# Patient Record
Sex: MALE | Race: WHITE | NOT HISPANIC OR LATINO | Employment: OTHER | URBAN - METROPOLITAN AREA
[De-identification: names, ages, dates, MRNs, and addresses within clinical notes are randomized per-mention and may not be internally consistent; named-entity substitution may affect disease eponyms.]

---

## 2023-02-17 PROBLEM — R60.0 BILATERAL LOWER EXTREMITY EDEMA: Status: ACTIVE | Noted: 2023-02-17

## 2023-02-17 PROBLEM — R33.9 URINARY RETENTION: Status: ACTIVE | Noted: 2023-02-17

## 2023-02-17 PROBLEM — N28.9 RENAL INSUFFICIENCY: Status: ACTIVE | Noted: 2023-02-17

## 2023-02-17 PROBLEM — E78.5 HYPERLIPIDEMIA: Status: ACTIVE | Noted: 2023-02-17

## 2023-02-17 PROBLEM — R31.0 GROSS HEMATURIA: Status: ACTIVE | Noted: 2023-02-17

## 2023-02-17 PROBLEM — R01.1 MURMUR, CARDIAC: Status: ACTIVE | Noted: 2023-02-17

## 2023-02-17 PROBLEM — R35.1 NOCTURIA: Status: ACTIVE | Noted: 2023-02-17

## 2023-02-17 PROBLEM — I10 HYPERTENSION: Status: ACTIVE | Noted: 2023-02-17

## 2023-02-17 PROBLEM — D64.9 ANEMIA: Status: ACTIVE | Noted: 2023-02-17

## 2023-02-17 PROBLEM — G47.30 SLEEP APNEA: Status: ACTIVE | Noted: 2023-02-17

## 2023-02-17 PROBLEM — E87.3 METABOLIC ALKALOSIS: Status: ACTIVE | Noted: 2023-02-17

## 2023-02-17 PROBLEM — E66.01 CLASS 2 SEVERE OBESITY DUE TO EXCESS CALORIES WITH SERIOUS COMORBIDITY AND BODY MASS INDEX (BMI) OF 36.0 TO 36.9 IN ADULT (MULTI): Status: ACTIVE | Noted: 2023-02-17

## 2023-02-17 PROBLEM — E66.812 CLASS 2 SEVERE OBESITY DUE TO EXCESS CALORIES WITH SERIOUS COMORBIDITY AND BODY MASS INDEX (BMI) OF 36.0 TO 36.9 IN ADULT: Status: ACTIVE | Noted: 2023-02-17

## 2023-02-17 PROBLEM — N18.31 CHRONIC KIDNEY DISEASE, STAGE 3A (MULTI): Status: ACTIVE | Noted: 2023-02-17

## 2023-02-17 RX ORDER — PRAVASTATIN SODIUM 40 MG/1
40 TABLET ORAL DAILY
COMMUNITY
Start: 2022-11-15 | End: 2023-04-10 | Stop reason: SDUPTHER

## 2023-02-17 RX ORDER — METOPROLOL SUCCINATE 50 MG/1
50 TABLET, EXTENDED RELEASE ORAL DAILY
COMMUNITY
End: 2023-04-10 | Stop reason: SDUPTHER

## 2023-02-17 RX ORDER — ASPIRIN 81 MG/1
81 TABLET ORAL DAILY
COMMUNITY

## 2023-02-17 RX ORDER — CHOLECALCIFEROL (VITAMIN D3) 25 MCG
25 TABLET ORAL DAILY
COMMUNITY
End: 2023-10-09 | Stop reason: SDUPTHER

## 2023-02-17 RX ORDER — FUROSEMIDE 40 MG/1
40 TABLET ORAL DAILY
COMMUNITY
Start: 2021-07-09 | End: 2023-04-10 | Stop reason: SDUPTHER

## 2023-02-17 RX ORDER — TAMSULOSIN HYDROCHLORIDE 0.4 MG/1
0.4 CAPSULE ORAL NIGHTLY
COMMUNITY
Start: 2021-10-14 | End: 2023-10-21 | Stop reason: SDUPTHER

## 2023-02-17 RX ORDER — BENAZEPRIL HYDROCHLORIDE 20 MG/1
20 TABLET ORAL DAILY
COMMUNITY
End: 2023-04-10 | Stop reason: SDUPTHER

## 2023-04-10 ENCOUNTER — OFFICE VISIT (OUTPATIENT)
Dept: PRIMARY CARE | Facility: CLINIC | Age: 68
End: 2023-04-10
Payer: MEDICARE

## 2023-04-10 VITALS
HEART RATE: 75 BPM | OXYGEN SATURATION: 96 % | BODY MASS INDEX: 36.02 KG/M2 | SYSTOLIC BLOOD PRESSURE: 112 MMHG | WEIGHT: 211 LBS | DIASTOLIC BLOOD PRESSURE: 70 MMHG | HEIGHT: 64 IN

## 2023-04-10 DIAGNOSIS — R60.0 BILATERAL LOWER EXTREMITY EDEMA: ICD-10-CM

## 2023-04-10 DIAGNOSIS — N18.31 CHRONIC KIDNEY DISEASE, STAGE 3A (MULTI): ICD-10-CM

## 2023-04-10 DIAGNOSIS — Z00.00 MEDICARE ANNUAL WELLNESS VISIT, SUBSEQUENT: ICD-10-CM

## 2023-04-10 DIAGNOSIS — E78.2 MIXED HYPERLIPIDEMIA: ICD-10-CM

## 2023-04-10 DIAGNOSIS — E66.01 CLASS 2 SEVERE OBESITY DUE TO EXCESS CALORIES WITH SERIOUS COMORBIDITY AND BODY MASS INDEX (BMI) OF 36.0 TO 36.9 IN ADULT (MULTI): Primary | ICD-10-CM

## 2023-04-10 DIAGNOSIS — I10 PRIMARY HYPERTENSION: ICD-10-CM

## 2023-04-10 PROBLEM — N28.9 RENAL INSUFFICIENCY: Status: RESOLVED | Noted: 2023-02-17 | Resolved: 2023-04-10

## 2023-04-10 PROCEDURE — 3074F SYST BP LT 130 MM HG: CPT | Performed by: NURSE PRACTITIONER

## 2023-04-10 PROCEDURE — 1159F MED LIST DOCD IN RCRD: CPT | Performed by: NURSE PRACTITIONER

## 2023-04-10 PROCEDURE — 3078F DIAST BP <80 MM HG: CPT | Performed by: NURSE PRACTITIONER

## 2023-04-10 PROCEDURE — 3008F BODY MASS INDEX DOCD: CPT | Performed by: NURSE PRACTITIONER

## 2023-04-10 PROCEDURE — 1123F ACP DISCUSS/DSCN MKR DOCD: CPT | Performed by: NURSE PRACTITIONER

## 2023-04-10 PROCEDURE — 1036F TOBACCO NON-USER: CPT | Performed by: NURSE PRACTITIONER

## 2023-04-10 PROCEDURE — 1160F RVW MEDS BY RX/DR IN RCRD: CPT | Performed by: NURSE PRACTITIONER

## 2023-04-10 PROCEDURE — 1158F ADVNC CARE PLAN TLK DOCD: CPT | Performed by: NURSE PRACTITIONER

## 2023-04-10 PROCEDURE — 99214 OFFICE O/P EST MOD 30 MIN: CPT | Performed by: NURSE PRACTITIONER

## 2023-04-10 PROCEDURE — 1170F FXNL STATUS ASSESSED: CPT | Performed by: NURSE PRACTITIONER

## 2023-04-10 PROCEDURE — G0439 PPPS, SUBSEQ VISIT: HCPCS | Performed by: NURSE PRACTITIONER

## 2023-04-10 RX ORDER — FUROSEMIDE 40 MG/1
40 TABLET ORAL DAILY
Qty: 90 TABLET | Refills: 1 | Status: SHIPPED | OUTPATIENT
Start: 2023-04-10 | End: 2023-10-09 | Stop reason: SDUPTHER

## 2023-04-10 RX ORDER — METOPROLOL SUCCINATE 50 MG/1
50 TABLET, EXTENDED RELEASE ORAL DAILY
Qty: 90 TABLET | Refills: 1 | Status: SHIPPED | OUTPATIENT
Start: 2023-04-10 | End: 2023-10-09 | Stop reason: SDUPTHER

## 2023-04-10 RX ORDER — PRAVASTATIN SODIUM 40 MG/1
40 TABLET ORAL DAILY
Qty: 90 TABLET | Refills: 1 | Status: SHIPPED | OUTPATIENT
Start: 2023-04-10 | End: 2023-10-09 | Stop reason: SDUPTHER

## 2023-04-10 RX ORDER — BENAZEPRIL HYDROCHLORIDE 20 MG/1
20 TABLET ORAL DAILY
Qty: 90 TABLET | Refills: 1 | Status: SHIPPED | OUTPATIENT
Start: 2023-04-10 | End: 2023-10-09 | Stop reason: SDUPTHER

## 2023-04-10 ASSESSMENT — PATIENT HEALTH QUESTIONNAIRE - PHQ9
2. FEELING DOWN, DEPRESSED OR HOPELESS: NOT AT ALL
1. LITTLE INTEREST OR PLEASURE IN DOING THINGS: NOT AT ALL
SUM OF ALL RESPONSES TO PHQ9 QUESTIONS 1 AND 2: 0

## 2023-04-10 ASSESSMENT — ENCOUNTER SYMPTOMS
PALPITATIONS: 0
DIARRHEA: 0
DYSURIA: 0
BLOOD IN STOOL: 0
SHORTNESS OF BREATH: 0
VOMITING: 0
DIZZINESS: 0
LIGHT-HEADEDNESS: 0
ABDOMINAL PAIN: 0
HEADACHES: 0
ARTHRALGIAS: 0
NAUSEA: 0
CONSTIPATION: 0
MYALGIAS: 0
COLOR CHANGE: 0
CHEST TIGHTNESS: 0
FATIGUE: 0

## 2023-04-10 ASSESSMENT — ACTIVITIES OF DAILY LIVING (ADL)
MANAGING_FINANCES: NEEDS ASSISTANCE
DOING_HOUSEWORK: INDEPENDENT
DRESSING: INDEPENDENT
GROCERY_SHOPPING: NEEDS ASSISTANCE
BATHING: INDEPENDENT
TAKING_MEDICATION: INDEPENDENT

## 2023-04-10 NOTE — PROGRESS NOTES
"Subjective   Patient ID: Silvio Hassan is a 67 y.o. male who presents for [facility] Exam; routine check up; lab and med review.      HPI  Silvio here with his brother.       Review of Systems    Objective   Vitals:    04/10/23 1429   BP: 112/70   BP Location: Left arm   Pulse: 75   SpO2: 96%   Weight: 95.7 kg (211 lb)   Height: 1.626 m (5' 4\")      Physical Exam    Assessment/Plan   There are no diagnoses linked to this encounter.      "

## 2023-04-10 NOTE — ASSESSMENT & PLAN NOTE
Continue Lasix 40 mg daily.  Bilateral legs with non-pitting edema noted. Reduces with elevation.

## 2023-04-10 NOTE — ACP (ADVANCE CARE PLANNING)
Advance Care Planning Note     Discussion Date: 04/10/23   Discussion Participants: patient and brother    The patient wishes to discuss Advance Care Planning today and the following is a brief summary of our discussion.     Patient has capacity to make their own medical decisions:  has mental handicap  Health Care Agent/Surrogate Decision Maker documented in chart: Yes Victoriano Hassan    Documents on file and valid:  Advance Directive/Living Will:  has one, but not on file    Health Care Power of :  has one, but not on file  Other:       Communication of Medical Status/Prognosis:     Communication of Treatment Goals/Options:     Treatment Decisions    Time Statement: Total face to face time spent on advance care planning was 5 minutes with 5 minutes spent in counseling, including the explanation.    SCOTT Basilio-CNP  4/10/2023 3:01 PM

## 2023-04-10 NOTE — PROGRESS NOTES
"Subjective   Reason for Visit: Silvio Hassan is an 67 y.o. male here for a Medicare Wellness visit.     Past Medical, Surgical, and Family History reviewed and updated in chart.         LOAN Anguiano returns with his brother, Victoriano for Medicare wellness visit.     Patient Care Team:  SCOTT Basilio-CNP as PCP - General (Family Medicine)     Review of Systems   Constitutional:  Negative for fatigue.   Respiratory:  Negative for chest tightness and shortness of breath.    Cardiovascular:  Positive for leg swelling. Negative for chest pain and palpitations.   Gastrointestinal:  Negative for abdominal pain, blood in stool, constipation, diarrhea, nausea and vomiting.   Genitourinary:  Negative for dysuria.   Musculoskeletal:  Negative for arthralgias and myalgias.   Skin:  Negative for color change.   Neurological:  Negative for dizziness, light-headedness and headaches.       Objective   Vitals:  /70 (BP Location: Left arm)   Pulse 75   Ht 1.626 m (5' 4\")   Wt 95.7 kg (211 lb)   SpO2 96%   BMI 36.22 kg/m²       Physical Exam  Vitals and nursing note reviewed.   Constitutional:       Appearance: Normal appearance.   HENT:      Head: Normocephalic and atraumatic.   Cardiovascular:      Rate and Rhythm: Normal rate and regular rhythm.      Heart sounds: Murmur heard.   Pulmonary:      Effort: Pulmonary effort is normal.      Breath sounds: Normal breath sounds.   Abdominal:      General: Bowel sounds are normal.      Palpations: Abdomen is soft.   Musculoskeletal:      Right lower leg: Edema present.      Left lower leg: Edema present.   Skin:     General: Skin is warm and dry.   Neurological:      Mental Status: He is alert and oriented to person, place, and time.   Psychiatric:         Mood and Affect: Mood normal.         Behavior: Behavior normal.         Thought Content: Thought content normal.         Judgment: Judgment normal.         Assessment/Plan   Problem List Items Addressed This Visit       " Bilateral lower extremity edema    Current Assessment & Plan     Continue Lasix 40 mg daily.  Bilateral legs with non-pitting edema noted. Reduces with elevation.          Relevant Medications    furosemide (Lasix) 40 mg tablet    Chronic kidney disease, stage 3a    Current Assessment & Plan     Improved  4/7/23: BUN CR 20/0.98, eGFR 85         Hypertension    Current Assessment & Plan     Controlled today.   Continue:   Metoprolol 50 mg daily  Benazepril 20 mg daily  Furosemide 40 mg daily         Relevant Medications    metoprolol succinate XL (Toprol-XL) 50 mg 24 hr tablet    benazepril (Lotensin) 20 mg tablet    Class 2 severe obesity due to excess calories with serious comorbidity and body mass index (BMI) of 36.0 to 36.9 in adult (CMS/[facility]) - Primary    Hyperlipidemia    Overview     4/7/23: Chol 131, HDL 48, LDL 65, Triglycerides 97         Current Assessment & Plan     Continue Pravastatin 40 mg daily  Repeat lipid panel in 1 year.          Relevant Medications    pravastatin (Pravachol) 40 mg tablet     Other Visit Diagnoses       Medicare annual wellness visit, subsequent                  Follow up in 6 months for chronic care.

## 2023-04-10 NOTE — ASSESSMENT & PLAN NOTE
Controlled today.   Continue:   Metoprolol 50 mg daily  Benazepril 20 mg daily  Furosemide 40 mg daily

## 2023-04-16 PROBLEM — R31.0 GROSS HEMATURIA: Status: RESOLVED | Noted: 2023-02-17 | Resolved: 2023-04-16

## 2023-04-16 PROBLEM — E87.3 METABOLIC ALKALOSIS: Status: RESOLVED | Noted: 2023-02-17 | Resolved: 2023-04-16

## 2023-04-16 PROBLEM — R33.9 URINARY RETENTION: Status: RESOLVED | Noted: 2023-02-17 | Resolved: 2023-04-16

## 2023-04-16 NOTE — PATIENT INSTRUCTIONS
BMI was above normal measurement. Current weight:    Weight change since last visit (-) denotes wt loss     Weight loss needed to achieve BMI 25:   Lbs  Weight loss needed to achieve BMI 30:   Lbs  Instructed on a healthy diet and exercise.

## 2023-10-09 ENCOUNTER — OFFICE VISIT (OUTPATIENT)
Dept: PRIMARY CARE | Facility: CLINIC | Age: 68
End: 2023-10-09
Payer: MEDICARE

## 2023-10-09 VITALS
HEIGHT: 64 IN | WEIGHT: 226.25 LBS | HEART RATE: 86 BPM | SYSTOLIC BLOOD PRESSURE: 118 MMHG | BODY MASS INDEX: 38.63 KG/M2 | DIASTOLIC BLOOD PRESSURE: 60 MMHG | OXYGEN SATURATION: 95 %

## 2023-10-09 DIAGNOSIS — Z23 IMMUNIZATION DUE: ICD-10-CM

## 2023-10-09 DIAGNOSIS — R21 RASH: ICD-10-CM

## 2023-10-09 DIAGNOSIS — E78.2 MIXED HYPERLIPIDEMIA: ICD-10-CM

## 2023-10-09 DIAGNOSIS — E66.01 CLASS 2 SEVERE OBESITY DUE TO EXCESS CALORIES WITH SERIOUS COMORBIDITY AND BODY MASS INDEX (BMI) OF 38.0 TO 38.9 IN ADULT (MULTI): ICD-10-CM

## 2023-10-09 DIAGNOSIS — E55.9 VITAMIN D DEFICIENCY: ICD-10-CM

## 2023-10-09 DIAGNOSIS — I10 PRIMARY HYPERTENSION: Primary | ICD-10-CM

## 2023-10-09 DIAGNOSIS — R60.0 BILATERAL LOWER EXTREMITY EDEMA: ICD-10-CM

## 2023-10-09 PROCEDURE — 1036F TOBACCO NON-USER: CPT | Performed by: NURSE PRACTITIONER

## 2023-10-09 PROCEDURE — 99214 OFFICE O/P EST MOD 30 MIN: CPT | Performed by: NURSE PRACTITIONER

## 2023-10-09 PROCEDURE — 3078F DIAST BP <80 MM HG: CPT | Performed by: NURSE PRACTITIONER

## 2023-10-09 PROCEDURE — 1160F RVW MEDS BY RX/DR IN RCRD: CPT | Performed by: NURSE PRACTITIONER

## 2023-10-09 PROCEDURE — 90662 IIV NO PRSV INCREASED AG IM: CPT | Performed by: NURSE PRACTITIONER

## 2023-10-09 PROCEDURE — 1158F ADVNC CARE PLAN TLK DOCD: CPT | Performed by: NURSE PRACTITIONER

## 2023-10-09 PROCEDURE — 3008F BODY MASS INDEX DOCD: CPT | Performed by: NURSE PRACTITIONER

## 2023-10-09 PROCEDURE — G0008 ADMIN INFLUENZA VIRUS VAC: HCPCS | Performed by: NURSE PRACTITIONER

## 2023-10-09 PROCEDURE — 3074F SYST BP LT 130 MM HG: CPT | Performed by: NURSE PRACTITIONER

## 2023-10-09 PROCEDURE — 1159F MED LIST DOCD IN RCRD: CPT | Performed by: NURSE PRACTITIONER

## 2023-10-09 RX ORDER — FUROSEMIDE 40 MG/1
40 TABLET ORAL DAILY
Qty: 90 TABLET | Refills: 1 | Status: SHIPPED | OUTPATIENT
Start: 2023-10-09 | End: 2024-03-13 | Stop reason: SDUPTHER

## 2023-10-09 RX ORDER — BENAZEPRIL HYDROCHLORIDE 20 MG/1
20 TABLET ORAL DAILY
Qty: 90 TABLET | Refills: 1 | Status: SHIPPED | OUTPATIENT
Start: 2023-10-09

## 2023-10-09 RX ORDER — NYSTATIN 100000 U/G
CREAM TOPICAL 2 TIMES DAILY
Qty: 30 G | Refills: 0 | Status: SHIPPED | OUTPATIENT
Start: 2023-10-09

## 2023-10-09 RX ORDER — METOPROLOL SUCCINATE 50 MG/1
50 TABLET, EXTENDED RELEASE ORAL DAILY
Qty: 90 TABLET | Refills: 1 | Status: SHIPPED | OUTPATIENT
Start: 2023-10-09

## 2023-10-09 RX ORDER — CHOLECALCIFEROL (VITAMIN D3) 25 MCG
25 TABLET ORAL DAILY
Qty: 90 TABLET | Refills: 1 | Status: SHIPPED | OUTPATIENT
Start: 2023-10-09

## 2023-10-09 RX ORDER — PRAVASTATIN SODIUM 40 MG/1
40 TABLET ORAL DAILY
Qty: 90 TABLET | Refills: 1 | Status: SHIPPED | OUTPATIENT
Start: 2023-10-09 | End: 2024-05-20 | Stop reason: SDUPTHER

## 2023-10-09 ASSESSMENT — ENCOUNTER SYMPTOMS
HEADACHES: 0
PALPITATIONS: 0
VOMITING: 0
DIARRHEA: 0
CHEST TIGHTNESS: 0
FATIGUE: 0
NAUSEA: 0
BLOOD IN STOOL: 0
CONSTIPATION: 0
SHORTNESS OF BREATH: 0
DYSURIA: 0
MYALGIAS: 0
ABDOMINAL PAIN: 0
LIGHT-HEADEDNESS: 0
ARTHRALGIAS: 0
COLOR CHANGE: 0
DIZZINESS: 0

## 2023-10-09 NOTE — PROGRESS NOTES
"Subjective   Patient ID: Silvio Hassan is a 67 y.o. male who presents for Follow-up.    HPI   Silvio returns with his brother arash, for follow up.    No specific concerns. He did develop a small rash under his left axilla believed to be from his deodorant. They are going to change brands to see if it will clear up.       Rash of left axilla: Started a couple weeks ago, believed to be from deodorant, they are going to try different brands and then will try cream if symptoms persist.     Review of Systems   Constitutional:  Negative for fatigue.   Respiratory:  Negative for chest tightness and shortness of breath.    Cardiovascular:  Negative for chest pain, palpitations and leg swelling.   Gastrointestinal:  Negative for abdominal pain, blood in stool, constipation, diarrhea, nausea and vomiting.   Genitourinary:  Negative for dysuria.   Musculoskeletal:  Negative for arthralgias and myalgias.   Skin:  Negative for color change.   Neurological:  Negative for dizziness, light-headedness and headaches.       Objective   /60   Pulse 86   Ht 1.626 m (5' 4\")   Wt 103 kg (226 lb 4 oz)   SpO2 95%   BMI 38.84 kg/m²     Physical Exam  Vitals and nursing note reviewed.   Constitutional:       Appearance: Normal appearance.   Cardiovascular:      Rate and Rhythm: Normal rate and regular rhythm.      Heart sounds: Murmur heard.   Pulmonary:      Effort: Pulmonary effort is normal.      Breath sounds: Normal breath sounds.   Skin:            Comments: Left axilla redness raised rash.    Neurological:      Mental Status: He is alert and oriented to person, place, and time.   Psychiatric:         Mood and Affect: Mood normal.         Behavior: Behavior normal.         Thought Content: Thought content normal.         Judgment: Judgment normal.         Assessment/Plan   Problem List Items Addressed This Visit             ICD-10-CM    Bilateral lower extremity edema R60.0     Lasix 40 mg daily - refilled         Relevant " Medications    furosemide (Lasix) 40 mg tablet    Hypertension - Primary I10     Controlled  Metoprolol 50 mg daily- refilled  Benazepril 20 mg daily- refilled           Relevant Medications    benazepril (Lotensin) 20 mg tablet    metoprolol succinate XL (Toprol-XL) 50 mg 24 hr tablet    Other Relevant Orders    CBC and Auto Differential    Class 2 severe obesity due to excess calories with serious comorbidity and body mass index (BMI) of 38.0 to 38.9 in adult (CMS/[facility]) E66.01, Z68.38     Pt advised to institute a healthy, well-balanced meal with portion control and to exercise daily for at least 30-60 minutes.         Hyperlipidemia E78.5     Pravastatin 40 mg daily- refilled         Relevant Medications    pravastatin (Pravachol) 40 mg tablet    Other Relevant Orders    Comprehensive Metabolic Panel    Lipid Panel    Vitamin D deficiency E55.9    Relevant Medications    cholecalciferol (Vitamin D-3) 25 MCG (1000 UT) tablet     Other Visit Diagnoses         Codes    Rash     R21    Relevant Medications    nystatin (Mycostatin) cream    Immunization due     Z23    Relevant Orders    Flu vaccine, quadrivalent, high-dose, preservative free, age 65y+ (FLUZONE) (Completed)               Follow up in 6 months for [facility] visit. Return precautions discussed. They will get lab work prior to appointment.     Flu vaccine today.

## 2023-10-21 DIAGNOSIS — N40.0 BENIGN PROSTATIC HYPERPLASIA, UNSPECIFIED WHETHER LOWER URINARY TRACT SYMPTOMS PRESENT: Primary | ICD-10-CM

## 2023-10-21 RX ORDER — TAMSULOSIN HYDROCHLORIDE 0.4 MG/1
0.4 CAPSULE ORAL NIGHTLY
Qty: 90 CAPSULE | Refills: 1 | Status: SHIPPED | OUTPATIENT
Start: 2023-10-21 | End: 2024-03-13 | Stop reason: SDUPTHER

## 2023-10-21 NOTE — PROGRESS NOTES
Phone call this am stating that patient's meds were sent in at his last appointment but the pharmacy did not have the Rx for tamsulosin - Rx was sent in today.

## 2023-11-28 ENCOUNTER — TELEPHONE (OUTPATIENT)
Dept: PRIMARY CARE | Facility: CLINIC | Age: 68
End: 2023-11-28

## 2023-11-28 DIAGNOSIS — R21 RASH: Primary | ICD-10-CM

## 2023-11-28 RX ORDER — TRIAMCINOLONE ACETONIDE 1 MG/G
CREAM TOPICAL 2 TIMES DAILY
Qty: 15 G | Refills: 0 | Status: SHIPPED | OUTPATIENT
Start: 2023-11-28 | End: 2023-12-28 | Stop reason: SDUPTHER

## 2023-11-28 NOTE — TELEPHONE ENCOUNTER
Mom called in the rash under Harry's arm is not getting any better, may be getting worse.   She states she is doing everything they were told to do.

## 2023-12-28 ENCOUNTER — TELEPHONE (OUTPATIENT)
Dept: PRIMARY CARE | Facility: CLINIC | Age: 68
End: 2023-12-28

## 2023-12-28 DIAGNOSIS — R21 RASH: ICD-10-CM

## 2023-12-28 RX ORDER — TRIAMCINOLONE ACETONIDE 1 MG/G
CREAM TOPICAL 2 TIMES DAILY
Qty: 80 G | Refills: 1 | Status: SHIPPED | OUTPATIENT
Start: 2023-12-28

## 2023-12-28 NOTE — TELEPHONE ENCOUNTER
Received a call from Family   they used all the 2nd cream that was called in.   This cream did help a little   The rash improved.   However did not go away.   He is now out of the cream and the rash is back.   They have an appointment with you in 2 weeks.  They are asking for the same cream or something different until they can get in to see you.

## 2024-03-12 ENCOUNTER — TELEPHONE (OUTPATIENT)
Dept: PRIMARY CARE | Facility: CLINIC | Age: 69
End: 2024-03-12
Payer: MEDICARE

## 2024-03-12 NOTE — TELEPHONE ENCOUNTER
Victoriano (Brother) stopped by requesting medication refills on tamsulosin 0.4mg and furosemide 40mg for Silvio. Pharmacy is [address]

## 2024-03-13 DIAGNOSIS — R60.0 BILATERAL LOWER EXTREMITY EDEMA: ICD-10-CM

## 2024-03-13 DIAGNOSIS — N40.0 BENIGN PROSTATIC HYPERPLASIA, UNSPECIFIED WHETHER LOWER URINARY TRACT SYMPTOMS PRESENT: ICD-10-CM

## 2024-03-13 RX ORDER — TAMSULOSIN HYDROCHLORIDE 0.4 MG/1
0.4 CAPSULE ORAL NIGHTLY
Qty: 90 CAPSULE | Refills: 1 | Status: SHIPPED | OUTPATIENT
Start: 2024-03-13

## 2024-03-13 RX ORDER — FUROSEMIDE 40 MG/1
40 TABLET ORAL DAILY
Qty: 90 TABLET | Refills: 1 | Status: SHIPPED | OUTPATIENT
Start: 2024-03-13

## 2024-05-20 ENCOUNTER — OFFICE VISIT (OUTPATIENT)
Dept: PRIMARY CARE | Facility: CLINIC | Age: 69
End: 2024-05-20
Payer: MEDICARE

## 2024-05-20 VITALS
BODY MASS INDEX: 41 KG/M2 | DIASTOLIC BLOOD PRESSURE: 80 MMHG | WEIGHT: 240.13 LBS | HEIGHT: 64 IN | HEART RATE: 73 BPM | SYSTOLIC BLOOD PRESSURE: 116 MMHG

## 2024-05-20 DIAGNOSIS — I50.9 CHRONIC HEART FAILURE, UNSPECIFIED HEART FAILURE TYPE (MULTI): ICD-10-CM

## 2024-05-20 DIAGNOSIS — E66.01 CLASS 3 SEVERE OBESITY DUE TO EXCESS CALORIES WITH SERIOUS COMORBIDITY AND BODY MASS INDEX (BMI) OF 40.0 TO 44.9 IN ADULT (MULTI): ICD-10-CM

## 2024-05-20 DIAGNOSIS — D69.6 THROMBOCYTOPENIA (CMS-HCC): ICD-10-CM

## 2024-05-20 DIAGNOSIS — E78.2 MIXED HYPERLIPIDEMIA: ICD-10-CM

## 2024-05-20 DIAGNOSIS — N18.31 CHRONIC KIDNEY DISEASE, STAGE 3A (MULTI): ICD-10-CM

## 2024-05-20 DIAGNOSIS — Z00.00 MEDICARE ANNUAL WELLNESS VISIT, SUBSEQUENT: Primary | ICD-10-CM

## 2024-05-20 DIAGNOSIS — Z12.5 SCREENING FOR PROSTATE CANCER: ICD-10-CM

## 2024-05-20 PROCEDURE — 1160F RVW MEDS BY RX/DR IN RCRD: CPT | Performed by: NURSE PRACTITIONER

## 2024-05-20 PROCEDURE — 3008F BODY MASS INDEX DOCD: CPT | Performed by: NURSE PRACTITIONER

## 2024-05-20 PROCEDURE — 3079F DIAST BP 80-89 MM HG: CPT | Performed by: NURSE PRACTITIONER

## 2024-05-20 PROCEDURE — 99213 OFFICE O/P EST LOW 20 MIN: CPT | Performed by: NURSE PRACTITIONER

## 2024-05-20 PROCEDURE — 1123F ACP DISCUSS/DSCN MKR DOCD: CPT | Performed by: NURSE PRACTITIONER

## 2024-05-20 PROCEDURE — 1159F MED LIST DOCD IN RCRD: CPT | Performed by: NURSE PRACTITIONER

## 2024-05-20 PROCEDURE — 3074F SYST BP LT 130 MM HG: CPT | Performed by: NURSE PRACTITIONER

## 2024-05-20 PROCEDURE — 1170F FXNL STATUS ASSESSED: CPT | Performed by: NURSE PRACTITIONER

## 2024-05-20 PROCEDURE — G0439 PPPS, SUBSEQ VISIT: HCPCS | Performed by: NURSE PRACTITIONER

## 2024-05-20 RX ORDER — PRAVASTATIN SODIUM 40 MG/1
40 TABLET ORAL DAILY
Qty: 90 TABLET | Refills: 1 | Status: SHIPPED | OUTPATIENT
Start: 2024-05-20

## 2024-05-20 ASSESSMENT — ACTIVITIES OF DAILY LIVING (ADL)
DRESSING: INDEPENDENT
BATHING: INDEPENDENT
GROCERY_SHOPPING: NEEDS ASSISTANCE
MANAGING_FINANCES: NEEDS ASSISTANCE
TAKING_MEDICATION: INDEPENDENT
DOING_HOUSEWORK: INDEPENDENT

## 2024-05-20 ASSESSMENT — ENCOUNTER SYMPTOMS
DEPRESSION: 0
LOSS OF SENSATION IN FEET: 0
OCCASIONAL FEELINGS OF UNSTEADINESS: 0

## 2024-05-20 NOTE — PROGRESS NOTES
"Subjective   Reason for Visit: Silvio Hassan is an 68 y.o. male here for a Medicare Wellness visit.     Past Medical, Surgical, and Family History reviewed and updated in chart.         HPI  Silvio returns with his brother, Victoriano for [facility] exam. No concerns today.     His weight has increased some from last visit. He has slight edema noted to bilateral legs. Right > left. Also noted some redness to bilateral legs again R> left.     PSA: ordered  Colon Cancer screen:       Patient Care Team:  SCOTT Basilio-CNP as PCP - General (Family Medicine)     Review of Systems   Constitutional:  Negative for fatigue.   Respiratory:  Negative for chest tightness and shortness of breath.    Cardiovascular:  Positive for leg swelling (bilateral legs right > left). Negative for chest pain and palpitations.   Gastrointestinal:  Negative for abdominal pain, blood in stool, constipation, diarrhea, nausea and vomiting.   Genitourinary:  Negative for dysuria.   Musculoskeletal:  Negative for arthralgias and myalgias.   Skin:  Negative for color change.   Neurological:  Negative for dizziness, light-headedness and headaches.   Hematological: Negative.    Psychiatric/Behavioral: Negative.         Objective   Vitals:  /80   Pulse 73   Ht 1.626 m (5' 4\")   Wt 109 kg (240 lb 2 oz)   BMI 41.22 kg/m²       Physical Exam  Vitals and nursing note reviewed.   Constitutional:       Appearance: Normal appearance.   HENT:      Head: Normocephalic and atraumatic.   Cardiovascular:      Rate and Rhythm: Normal rate and regular rhythm.      Pulses: Normal pulses.      Heart sounds: Normal heart sounds.   Pulmonary:      Effort: Pulmonary effort is normal.      Breath sounds: Normal breath sounds.   Abdominal:      General: Bowel sounds are normal.      Palpations: Abdomen is soft.   Musculoskeletal:         General: Normal range of motion.      Cervical back: Normal range of motion.   Skin:     General: Skin is warm and dry.      " Findings: Erythema present.      Comments: Bilateral lower legs Right> left    Neurological:      General: No focal deficit present.      Mental Status: He is alert and oriented to person, place, and time. Mental status is at baseline.   Psychiatric:         Mood and Affect: Mood normal.         Behavior: Behavior normal.         Thought Content: Thought content normal.         Judgment: Judgment normal.         Assessment/Plan   Problem List Items Addressed This Visit       Chronic kidney disease, stage 3a (Multi)    Current Assessment & Plan     Did not get lab work before appt, is going to do this  Will follow up         Class 3 severe obesity due to excess calories with serious comorbidity and body mass index (BMI) of 40.0 to 44.9 in adult (Multi)    Hyperlipidemia    Overview     4/7/23: Chol 131, HDL 48, LDL 65, Triglycerides 97         Current Assessment & Plan     Pravastatin 40 mg daily- refilled         Relevant Medications    pravastatin (Pravachol) 40 mg tablet    Chronic heart failure, unspecified heart failure type (Multi)    Current Assessment & Plan     Stable  No shortness of breath  Weight is increased, slight lower extremity edema.          Thrombocytopenia (CMS-HCC)    Current Assessment & Plan     Waiting on lab work-did not get before his appt today.           Other Visit Diagnoses       Medicare annual wellness visit, subsequent    -  Primary    Screening for prostate cancer        Relevant Orders    Prostate Spec.Ag,Screen              Follow up in 6 months. We will contact them with results of the labs.

## 2024-05-24 PROBLEM — I50.9 CHRONIC HEART FAILURE, UNSPECIFIED HEART FAILURE TYPE (MULTI): Status: ACTIVE | Noted: 2024-05-24

## 2024-05-24 PROBLEM — D69.6 THROMBOCYTOPENIA (CMS-HCC): Status: ACTIVE | Noted: 2024-05-24

## 2024-05-24 PROBLEM — E66.813 CLASS 3 SEVERE OBESITY DUE TO EXCESS CALORIES WITH SERIOUS COMORBIDITY AND BODY MASS INDEX (BMI) OF 40.0 TO 44.9 IN ADULT: Status: ACTIVE | Noted: 2023-02-17

## 2024-05-24 ASSESSMENT — ENCOUNTER SYMPTOMS
BLOOD IN STOOL: 0
CONSTIPATION: 0
CHEST TIGHTNESS: 0
HEMATOLOGIC/LYMPHATIC NEGATIVE: 1
LIGHT-HEADEDNESS: 0
DIARRHEA: 0
DYSURIA: 0
ARTHRALGIAS: 0
MYALGIAS: 0
DIZZINESS: 0
ABDOMINAL PAIN: 0
COLOR CHANGE: 0
FATIGUE: 0
PALPITATIONS: 0
VOMITING: 0
NAUSEA: 0
PSYCHIATRIC NEGATIVE: 1
HEADACHES: 0
SHORTNESS OF BREATH: 0

## 2024-07-01 DIAGNOSIS — E78.2 MIXED HYPERLIPIDEMIA: ICD-10-CM

## 2024-07-01 DIAGNOSIS — I10 PRIMARY HYPERTENSION: ICD-10-CM

## 2024-07-01 DIAGNOSIS — R60.0 BILATERAL LOWER EXTREMITY EDEMA: ICD-10-CM

## 2024-07-01 DIAGNOSIS — N40.0 BENIGN PROSTATIC HYPERPLASIA, UNSPECIFIED WHETHER LOWER URINARY TRACT SYMPTOMS PRESENT: ICD-10-CM

## 2024-07-08 RX ORDER — FUROSEMIDE 40 MG/1
40 TABLET ORAL DAILY
Qty: 90 TABLET | Refills: 1 | Status: SHIPPED | OUTPATIENT
Start: 2024-07-08

## 2024-07-08 RX ORDER — TAMSULOSIN HYDROCHLORIDE 0.4 MG/1
0.4 CAPSULE ORAL NIGHTLY
Qty: 90 CAPSULE | Refills: 1 | Status: SHIPPED | OUTPATIENT
Start: 2024-07-08

## 2024-07-08 RX ORDER — BENAZEPRIL HYDROCHLORIDE 20 MG/1
20 TABLET ORAL DAILY
Qty: 90 TABLET | Refills: 1 | Status: SHIPPED | OUTPATIENT
Start: 2024-07-08

## 2024-07-08 RX ORDER — PRAVASTATIN SODIUM 40 MG/1
40 TABLET ORAL DAILY
Qty: 90 TABLET | Refills: 1 | Status: SHIPPED | OUTPATIENT
Start: 2024-07-08

## 2024-07-08 RX ORDER — METOPROLOL SUCCINATE 50 MG/1
50 TABLET, EXTENDED RELEASE ORAL DAILY
Qty: 90 TABLET | Refills: 1 | Status: SHIPPED | OUTPATIENT
Start: 2024-07-08

## 2024-08-26 ENCOUNTER — APPOINTMENT (OUTPATIENT)
Dept: PRIMARY CARE | Facility: CLINIC | Age: 69
End: 2024-08-26
Payer: MEDICARE

## 2024-09-05 DIAGNOSIS — Z91.030 H/O BEE STING ALLERGY: Primary | ICD-10-CM

## 2024-09-05 RX ORDER — EPINEPHRINE 0.15 MG/.15ML
0.15 INJECTION SUBCUTANEOUS ONCE AS NEEDED
Qty: 1 EACH | Refills: 0 | Status: SHIPPED | OUTPATIENT
Start: 2024-09-05

## 2024-10-14 ENCOUNTER — APPOINTMENT (OUTPATIENT)
Dept: PRIMARY CARE | Facility: CLINIC | Age: 69
End: 2024-10-14
Payer: MEDICARE

## 2024-11-04 ENCOUNTER — APPOINTMENT (OUTPATIENT)
Dept: PRIMARY CARE | Facility: CLINIC | Age: 69
End: 2024-11-04
Payer: MEDICARE

## 2024-11-20 ENCOUNTER — APPOINTMENT (OUTPATIENT)
Age: 69
End: 2024-11-20
Payer: MEDICARE

## 2024-12-09 ENCOUNTER — APPOINTMENT (OUTPATIENT)
Dept: PRIMARY CARE | Facility: CLINIC | Age: 69
End: 2024-12-09
Payer: MEDICARE

## 2024-12-09 VITALS
HEIGHT: 64 IN | DIASTOLIC BLOOD PRESSURE: 90 MMHG | WEIGHT: 249.25 LBS | SYSTOLIC BLOOD PRESSURE: 128 MMHG | BODY MASS INDEX: 42.55 KG/M2 | HEART RATE: 80 BPM

## 2024-12-09 DIAGNOSIS — E66.813 CLASS 3 SEVERE OBESITY DUE TO EXCESS CALORIES WITH SERIOUS COMORBIDITY AND BODY MASS INDEX (BMI) OF 40.0 TO 44.9 IN ADULT: ICD-10-CM

## 2024-12-09 DIAGNOSIS — E78.2 MIXED HYPERLIPIDEMIA: ICD-10-CM

## 2024-12-09 DIAGNOSIS — N40.0 BENIGN PROSTATIC HYPERPLASIA, UNSPECIFIED WHETHER LOWER URINARY TRACT SYMPTOMS PRESENT: ICD-10-CM

## 2024-12-09 DIAGNOSIS — R60.0 BILATERAL LOWER EXTREMITY EDEMA: ICD-10-CM

## 2024-12-09 DIAGNOSIS — I10 PRIMARY HYPERTENSION: Primary | ICD-10-CM

## 2024-12-09 DIAGNOSIS — E66.01 CLASS 3 SEVERE OBESITY DUE TO EXCESS CALORIES WITH SERIOUS COMORBIDITY AND BODY MASS INDEX (BMI) OF 40.0 TO 44.9 IN ADULT: ICD-10-CM

## 2024-12-09 DIAGNOSIS — E55.9 VITAMIN D DEFICIENCY: ICD-10-CM

## 2024-12-09 DIAGNOSIS — L98.9 SKIN LESION: ICD-10-CM

## 2024-12-09 DIAGNOSIS — R21 RASH: ICD-10-CM

## 2024-12-09 DIAGNOSIS — D69.6 THROMBOCYTOPENIA (CMS-HCC): ICD-10-CM

## 2024-12-09 DIAGNOSIS — R73.01 ELEVATED FASTING GLUCOSE: ICD-10-CM

## 2024-12-09 LAB
ALANINE AMINOTRANSFERASE (SGPT) (U/L) IN SER/PLAS EXTERNAL: 17 U/L
ALBUMIN (G/DL) IN SER/PLAS EXTERNAL: 3.8 G/DL
ALKALINE PHOSPHATASE (U/L) IN SER/PLAS EXTERNAL: 53 U/L
ASPARTATE AMINOTRANSFERASE (SGOT) (U/L) IN SER/PLAS EXTERNAL: 19 U/L
BILIRUBIN TOTAL (MG/DL) IN SER/PLAS EXTERNAL: 0.9 MG/DL
CALCIUM (MG/DL) IN SER/PLAS EXTERNAL: 8.9 MG/DL
CARBON DIOXIDE, TOTAL (MMOL/L) IN SER/PLAS EXTERNAL: 34 MMOL/L
CHLORIDE (MMOL/L) IN SER/PLAS EXTERNAL: 101 MMOL/L
CHOLESTEROL (MG/DL) IN SER/PLAS EXTERNAL: 126 MG/DL
CHOLESTEROL IN HDL (MG/DL) IN SER/PLAS EXTERNAL: 50 MG/DL
CHOLESTEROL IN LDL (MG/DL) IN SERUM OR PLASMA BY CALCULATION EXTERNAL: 60 MG/DL
CHOLESTEROL/HDL RATIO EXTERNAL: 2.5
CREATININE (MG/DL) IN SER/PLAS EXTERNAL: 1.24 MG/DL
GLOMERULAR FILTRATION RATE ML/MIN/1.73 SQ M.PREDICTED EXTERNAL: 64 ML/MIN/1.73M*2
GLUCOSE (MG/DL) IN SER/PLAS EXTERNAL: 120 MG/DL
NON HDL CHOLESTEROL EXTERNAL: 76 MG/DL
POTASSIUM (MMOL/L) IN SER/PLAS EXTERMA;: 4.4 MMOL/L
PROTEIN TOTAL EXTERNAL: 6 G/DL
SODIUM (MMOL/L) IN SER/PLAS EXTERNAL: 142 MMOL/L
TRIGLYCERIDE (MG/DL) IN SER/PLAS EXTERNAL: 81 MG/DL
UREA NITROGEN (MG/DL) IN SER/PLAS EXTERNAL: 20 MG/DL

## 2024-12-09 PROCEDURE — 99214 OFFICE O/P EST MOD 30 MIN: CPT | Performed by: NURSE PRACTITIONER

## 2024-12-09 PROCEDURE — 1123F ACP DISCUSS/DSCN MKR DOCD: CPT | Performed by: NURSE PRACTITIONER

## 2024-12-09 PROCEDURE — 3074F SYST BP LT 130 MM HG: CPT | Performed by: NURSE PRACTITIONER

## 2024-12-09 PROCEDURE — 1036F TOBACCO NON-USER: CPT | Performed by: NURSE PRACTITIONER

## 2024-12-09 PROCEDURE — 1160F RVW MEDS BY RX/DR IN RCRD: CPT | Performed by: NURSE PRACTITIONER

## 2024-12-09 PROCEDURE — 3080F DIAST BP >= 90 MM HG: CPT | Performed by: NURSE PRACTITIONER

## 2024-12-09 PROCEDURE — 1159F MED LIST DOCD IN RCRD: CPT | Performed by: NURSE PRACTITIONER

## 2024-12-09 PROCEDURE — 3008F BODY MASS INDEX DOCD: CPT | Performed by: NURSE PRACTITIONER

## 2024-12-09 PROCEDURE — G2211 COMPLEX E/M VISIT ADD ON: HCPCS | Performed by: NURSE PRACTITIONER

## 2024-12-09 RX ORDER — TRIAMCINOLONE ACETONIDE 1 MG/G
CREAM TOPICAL 2 TIMES DAILY
Qty: 80 G | Refills: 1 | Status: SHIPPED | OUTPATIENT
Start: 2024-12-09

## 2024-12-09 RX ORDER — FUROSEMIDE 40 MG/1
40 TABLET ORAL DAILY
Qty: 90 TABLET | Refills: 1 | Status: SHIPPED | OUTPATIENT
Start: 2024-12-09

## 2024-12-09 RX ORDER — METOPROLOL SUCCINATE 50 MG/1
50 TABLET, EXTENDED RELEASE ORAL DAILY
Qty: 90 TABLET | Refills: 1 | Status: SHIPPED | OUTPATIENT
Start: 2024-12-09

## 2024-12-09 RX ORDER — TAMSULOSIN HYDROCHLORIDE 0.4 MG/1
0.4 CAPSULE ORAL NIGHTLY
Qty: 90 CAPSULE | Refills: 1 | Status: SHIPPED | OUTPATIENT
Start: 2024-12-09

## 2024-12-09 RX ORDER — PRAVASTATIN SODIUM 40 MG/1
40 TABLET ORAL DAILY
Qty: 90 TABLET | Refills: 1 | Status: SHIPPED | OUTPATIENT
Start: 2024-12-09

## 2024-12-09 RX ORDER — ASPIRIN 81 MG/1
81 TABLET ORAL DAILY
Qty: 90 TABLET | Refills: 1 | Status: SHIPPED | OUTPATIENT
Start: 2024-12-09

## 2024-12-09 RX ORDER — BENAZEPRIL HYDROCHLORIDE 20 MG/1
20 TABLET ORAL DAILY
Qty: 90 TABLET | Refills: 1 | Status: SHIPPED | OUTPATIENT
Start: 2024-12-09

## 2024-12-09 RX ORDER — CHOLECALCIFEROL (VITAMIN D3) 25 MCG
1000 TABLET ORAL DAILY
Qty: 90 TABLET | Refills: 1 | Status: SHIPPED | OUTPATIENT
Start: 2024-12-09

## 2024-12-09 ASSESSMENT — ENCOUNTER SYMPTOMS
LIGHT-HEADEDNESS: 0
DYSURIA: 0
SHORTNESS OF BREATH: 0
DIARRHEA: 0
FATIGUE: 0
DIZZINESS: 0
BLOOD IN STOOL: 0
CHEST TIGHTNESS: 0
NAUSEA: 0
ABDOMINAL PAIN: 0
ARTHRALGIAS: 0
VOMITING: 0
PSYCHIATRIC NEGATIVE: 1
PALPITATIONS: 0
COLOR CHANGE: 0
HEADACHES: 0
CONSTIPATION: 0
MYALGIAS: 0

## 2024-12-09 NOTE — ASSESSMENT & PLAN NOTE
[facility] Pharmacy Note:  Pain Consult    Keyana Heller. is a 77year old male started on Dilaudid PCA by Dedra MARIE. Pharmacy was consulted to review medication profile and to discontinue previously ordered narcotics and sedatives.     Medication Will check A1c

## 2024-12-09 NOTE — PROGRESS NOTES
"Subjective   Patient ID: Silvio Hassan is a 68 y.o. male who presents for Follow-up.    HPI   Silvio is here with his brother for follow up.    He reports he still has his rash under his arm (left axilla). But he ran out of cream. They report that their mother that Harry had been living with has passed away in October so he is living on his own currently. Also has many moles that needs checked including questionable discolored mole on left temple.       Elevated glucose: 120, will check an A1c in 3 months with next lab draw.    Thrombocytopenia: does not bruise easily.       Review of Systems   Constitutional:  Negative for fatigue.   HENT: Negative.     Respiratory:  Negative for chest tightness and shortness of breath.    Cardiovascular:  Positive for leg swelling (redness swelling/right leg). Negative for chest pain and palpitations.   Gastrointestinal:  Negative for abdominal pain, blood in stool, constipation, diarrhea, nausea and vomiting.   Genitourinary:  Negative for dysuria.   Musculoskeletal:  Negative for arthralgias and myalgias.   Skin:  Negative for color change.        Many moles -needs skin exam d/t several in question   Neurological:  Negative for dizziness, light-headedness and headaches.   Psychiatric/Behavioral: Negative.         Objective   /90   Pulse 80   Ht 1.626 m (5' 4\")   Wt 113 kg (249 lb 4 oz)   BMI 42.78 kg/m²     Physical Exam  Vitals and nursing note reviewed.   Constitutional:       Appearance: Normal appearance.   HENT:      Head: Normocephalic and atraumatic.   Cardiovascular:      Rate and Rhythm: Normal rate and regular rhythm.      Pulses: Normal pulses.      Heart sounds: Murmur heard.   Pulmonary:      Effort: Pulmonary effort is normal.      Breath sounds: Normal breath sounds.   Abdominal:      General: Bowel sounds are normal.      Palpations: Abdomen is soft.   Musculoskeletal:      Cervical back: Normal range of motion.      Right lower leg: Edema (redness " noted to right lower extremity, not warm to touch) present.      Left lower leg: Edema present.   Skin:     General: Skin is warm and dry.      Capillary Refill: Capillary refill takes less than 2 seconds.      Findings: Lesion (left temple questionable mole) and rash (to left axilla) present.   Neurological:      General: No focal deficit present.      Mental Status: He is alert and oriented to person, place, and time.   Psychiatric:         Mood and Affect: Mood normal.         Behavior: Behavior normal.         Thought Content: Thought content normal.         Judgment: Judgment normal.         Assessment/Plan   Problem List Items Addressed This Visit             ICD-10-CM    Bilateral lower extremity edema R60.0     Lasix 40 mg daily - refilled  Compression socks ordered         Relevant Medications    furosemide (Lasix) 40 mg tablet    Other Relevant Orders    Compression Stockings 20-30 mmHg    Hypertension - Primary I10    Relevant Medications    benazepril (Lotensin) 20 mg tablet    metoprolol succinate XL (Toprol-XL) 50 mg 24 hr tablet    Class 3 severe obesity due to excess calories with serious comorbidity and body mass index (BMI) of 40.0 to 44.9 in adult E66.813, E66.01, Z68.41    Hyperlipidemia E78.5     11/14/24: chol 126, HDL 50, LDL 60, Tri 81         Relevant Medications    aspirin 81 mg EC tablet    pravastatin (Pravachol) 40 mg tablet    Vitamin D deficiency E55.9    Relevant Medications    cholecalciferol (Vitamin D-3) 25 MCG (1000 UT) tablet    Thrombocytopenia (CMS-HCC) D69.6     His platelets are low at 106  He is chronically low- will recheck in 3 months and call him with results         Relevant Medications    aspirin 81 mg EC tablet    Other Relevant Orders    CBC and Auto Differential    Rash R21    Relevant Medications    triamcinolone (Kenalog) 0.1 % cream    Benign prostatic hyperplasia N40.0     Flomax 0.4 mg daily- refilled         Relevant Medications    tamsulosin (Flomax) 0.4 mg 24  hr capsule    Elevated fasting glucose R73.01     Will check A1c         Relevant Orders    Hemoglobin A1C     Other Visit Diagnoses         Codes    Skin lesion     L98.9    Relevant Orders    Referral to Dermatology              Follow up in 6 months for [facility] exam. He will get labwork in 3 months, and we will contact them with results.     For any new medications that were prescribed today, the patient was educated about their indications for use, administration, frequency and potential side effects of the medication.

## 2024-12-09 NOTE — ASSESSMENT & PLAN NOTE
His platelets are low at 106  He is chronically low- will recheck in 3 months and call him with results

## 2025-05-09 ENCOUNTER — APPOINTMENT (OUTPATIENT)
Dept: PRIMARY CARE | Facility: CLINIC | Age: 70
End: 2025-05-09
Payer: MEDICARE

## 2025-06-05 DIAGNOSIS — N40.0 BENIGN PROSTATIC HYPERPLASIA, UNSPECIFIED WHETHER LOWER URINARY TRACT SYMPTOMS PRESENT: ICD-10-CM

## 2025-06-05 DIAGNOSIS — I10 PRIMARY HYPERTENSION: ICD-10-CM

## 2025-06-25 DIAGNOSIS — E78.2 MIXED HYPERLIPIDEMIA: ICD-10-CM

## 2025-06-25 DIAGNOSIS — I10 PRIMARY HYPERTENSION: ICD-10-CM

## 2025-06-25 DIAGNOSIS — R60.0 BILATERAL LOWER EXTREMITY EDEMA: ICD-10-CM

## 2025-06-25 DIAGNOSIS — N40.0 BENIGN PROSTATIC HYPERPLASIA, UNSPECIFIED WHETHER LOWER URINARY TRACT SYMPTOMS PRESENT: ICD-10-CM

## 2025-06-25 RX ORDER — METOPROLOL SUCCINATE 50 MG/1
50 TABLET, EXTENDED RELEASE ORAL DAILY
Qty: 90 TABLET | Refills: 1 | Status: SHIPPED | OUTPATIENT
Start: 2025-06-25

## 2025-06-25 RX ORDER — BENAZEPRIL HYDROCHLORIDE 20 MG/1
20 TABLET ORAL DAILY
Qty: 90 TABLET | Refills: 1 | Status: SHIPPED | OUTPATIENT
Start: 2025-06-25

## 2025-06-25 RX ORDER — FUROSEMIDE 40 MG/1
40 TABLET ORAL DAILY
Qty: 90 TABLET | Refills: 1 | Status: SHIPPED | OUTPATIENT
Start: 2025-06-25

## 2025-06-25 RX ORDER — PRAVASTATIN SODIUM 40 MG/1
40 TABLET ORAL DAILY
Qty: 90 TABLET | Refills: 1 | Status: SHIPPED | OUTPATIENT
Start: 2025-06-25

## 2025-06-25 RX ORDER — TAMSULOSIN HYDROCHLORIDE 0.4 MG/1
0.4 CAPSULE ORAL NIGHTLY
Qty: 90 CAPSULE | Refills: 1 | Status: SHIPPED | OUTPATIENT
Start: 2025-06-25

## 2025-07-08 LAB
BASOPHILS # BLD AUTO: 27 CELLS/UL (ref 0–200)
BASOPHILS NFR BLD AUTO: 0.4 %
EOSINOPHIL # BLD AUTO: 201 CELLS/UL (ref 15–500)
EOSINOPHIL NFR BLD AUTO: 3 %
ERYTHROCYTE [DISTWIDTH] IN BLOOD BY AUTOMATED COUNT: 13.5 % (ref 11–15)
HBA1C MFR BLD: 6.6 %
HCT VFR BLD AUTO: 50.3 % (ref 38.5–50)
HGB BLD-MCNC: 16.1 G/DL (ref 13.2–17.1)
LYMPHOCYTES # BLD AUTO: 1447 CELLS/UL (ref 850–3900)
LYMPHOCYTES NFR BLD AUTO: 21.6 %
MCH RBC QN AUTO: 32.3 PG (ref 27–33)
MCHC RBC AUTO-ENTMCNC: 32 G/DL (ref 32–36)
MCV RBC AUTO: 101 FL (ref 80–100)
MONOCYTES # BLD AUTO: 523 CELLS/UL (ref 200–950)
MONOCYTES NFR BLD AUTO: 7.8 %
NEUTROPHILS # BLD AUTO: 4502 CELLS/UL (ref 1500–7800)
NEUTROPHILS NFR BLD AUTO: 67.2 %
PLATELET # BLD AUTO: 92 THOUSAND/UL (ref 140–400)
PMV BLD REES-ECKER: 10.8 FL (ref 7.5–12.5)
RBC # BLD AUTO: 4.98 MILLION/UL (ref 4.2–5.8)
SERVICE CMNT-IMP: ABNORMAL
WBC # BLD AUTO: 6.7 THOUSAND/UL (ref 3.8–10.8)

## 2025-07-09 ENCOUNTER — APPOINTMENT (OUTPATIENT)
Age: 70
End: 2025-07-09
Payer: MEDICARE

## 2025-07-09 VITALS
HEART RATE: 71 BPM | SYSTOLIC BLOOD PRESSURE: 124 MMHG | WEIGHT: 249 LBS | DIASTOLIC BLOOD PRESSURE: 70 MMHG | BODY MASS INDEX: 42.51 KG/M2 | HEIGHT: 64 IN

## 2025-07-09 DIAGNOSIS — I50.9 CHRONIC HEART FAILURE, UNSPECIFIED HEART FAILURE TYPE: ICD-10-CM

## 2025-07-09 DIAGNOSIS — R73.09 ELEVATED HEMOGLOBIN A1C: ICD-10-CM

## 2025-07-09 DIAGNOSIS — D69.6 THROMBOCYTOPENIA: ICD-10-CM

## 2025-07-09 DIAGNOSIS — E66.813 CLASS 3 SEVERE OBESITY DUE TO EXCESS CALORIES WITH SERIOUS COMORBIDITY AND BODY MASS INDEX (BMI) OF 40.0 TO 44.9 IN ADULT: ICD-10-CM

## 2025-07-09 DIAGNOSIS — M79.661 PAIN AND SWELLING OF LOWER LEG, RIGHT: ICD-10-CM

## 2025-07-09 DIAGNOSIS — Z00.00 ROUTINE GENERAL MEDICAL EXAMINATION AT HEALTH CARE FACILITY: Primary | ICD-10-CM

## 2025-07-09 DIAGNOSIS — E78.2 MIXED HYPERLIPIDEMIA: ICD-10-CM

## 2025-07-09 DIAGNOSIS — M79.89 PAIN AND SWELLING OF LOWER LEG, RIGHT: ICD-10-CM

## 2025-07-09 DIAGNOSIS — Z12.5 SCREENING FOR PROSTATE CANCER: ICD-10-CM

## 2025-07-09 DIAGNOSIS — N18.31 CHRONIC KIDNEY DISEASE, STAGE 3A (MULTI): ICD-10-CM

## 2025-07-09 ASSESSMENT — ACTIVITIES OF DAILY LIVING (ADL)
DOING_HOUSEWORK: INDEPENDENT
GROCERY_SHOPPING: INDEPENDENT
DRESSING: INDEPENDENT
MANAGING_FINANCES: INDEPENDENT
BATHING: INDEPENDENT
TAKING_MEDICATION: INDEPENDENT

## 2025-07-09 ASSESSMENT — PATIENT HEALTH QUESTIONNAIRE - PHQ9
SUM OF ALL RESPONSES TO PHQ9 QUESTIONS 1 AND 2: 0
1. LITTLE INTEREST OR PLEASURE IN DOING THINGS: NOT AT ALL
2. FEELING DOWN, DEPRESSED OR HOPELESS: NOT AT ALL

## 2025-07-09 ASSESSMENT — ENCOUNTER SYMPTOMS
LOSS OF SENSATION IN FEET: 0
DEPRESSION: 0
OCCASIONAL FEELINGS OF UNSTEADINESS: 0

## 2025-07-09 NOTE — PROGRESS NOTES
"Subjective   Reason for Visit: Silvio Hassan is an 69 y.o. male here for a Medicare Wellness visit.     Past Medical, Surgical, and Family History reviewed and updated in chart.         HPI  Silvio returns for [facility] exam. He denies any concerns today. His brother has brought him today, but is in the waiting room today.     Right lower extremity discolored/swollen: he denies any pain to leg. Is warm to touch. His brother is unsure how long it has been like this, as Silvio has moved into a new apartment and has not let his family come to check on him.     HTN: controlled. Denies chest pain/tightness, palpitations, dizziness, or headache.     Patient Care Team:  ALYSSA Basilio as PCP - General (Family Medicine)  ALYSSA Basilio as PCP - [employer] PCP     Review of Systems   Constitutional:  Negative for fatigue and fever.   HENT: Negative.     Cardiovascular:         Heart murmur   Gastrointestinal: Negative.    Genitourinary: Negative.    Musculoskeletal:         Right lower leg swollen and discolored.   Skin:  Positive for color change (right lower leg discoloration noted).   Neurological: Negative.    Hematological: Negative.    Psychiatric/Behavioral: Negative.         Objective   Vitals:  /70   Pulse 71   Ht 1.626 m (5' 4\")   Wt 113 kg (249 lb)   BMI 42.74 kg/m²       Physical Exam  Constitutional:       Appearance: Normal appearance.   HENT:      Head: Normocephalic and atraumatic.   Cardiovascular:      Rate and Rhythm: Normal rate.      Heart sounds: Murmur heard.   Pulmonary:      Effort: Pulmonary effort is normal.      Breath sounds: Normal breath sounds.   Abdominal:      General: Bowel sounds are normal.      Palpations: Abdomen is soft.   Skin:     General: Skin is warm and dry.      Findings: Erythema (right lower extremity) present.             Comments: Significant discoloration noted to right lower leg. Warm to touch.    Neurological:      General: No focal " deficit present.      Mental Status: He is alert and oriented to person, place, and time. Mental status is at baseline.         Assessment & Plan  Routine general medical examination at health care facility  PSA: 11/14/24  Colon CA screen:         Pain and swelling of lower leg, right  US for DVT  May consider vascular medicine referral    Orders:    Vascular US Lower Extremity Venous Duplex Right; Future    Thrombocytopenia  Platelets were low-will repeat labs in a couple weeks and call patient  May refer to hematology  Orders:    CBC and Auto Differential; Future    Elevated hemoglobin A1c  7/7/25: A1c 6.6%  Will repeat this again in 6 months.   Orders:    Hemoglobin A1C; Future    Chronic heart failure, unspecified heart failure type  stable       Chronic kidney disease, stage 3a (Multi)  stable       Mixed hyperlipidemia  Lipid panel ordered  Pravastatin 40 mg nightly   Orders:    Lipid Panel; Future    Comprehensive Metabolic Panel; Future    Screening for prostate cancer  PSA ordered  Last done 11/14/24  Orders:    Prostate Spec.Ag,Screen; Future    Class 3 severe obesity due to excess calories with serious comorbidity and body mass index (BMI) of 40.0 to 44.9 in adult         Depression Screening  5 - 10 minutes were spent screening for depression.     Follow up in 6 months for chronic care. We will get a doppler to rule out DVT. Otherwise, we may need to refer to vascular medicine.     For any new medications that were prescribed today, the patient was educated about their indications for use, administration, frequency and potential side effects of the medication.

## 2025-07-09 NOTE — ASSESSMENT & PLAN NOTE
Platelets were low-will repeat labs in a couple weeks and call patient  May refer to hematology  Orders:    CBC and Auto Differential; Future

## 2025-07-09 NOTE — ASSESSMENT & PLAN NOTE
Lipid panel ordered  Pravastatin 40 mg nightly   Orders:    Lipid Panel; Future    Comprehensive Metabolic Panel; Future

## 2025-07-12 PROBLEM — R73.09 ELEVATED HEMOGLOBIN A1C: Status: ACTIVE | Noted: 2025-07-12

## 2025-07-12 ASSESSMENT — ENCOUNTER SYMPTOMS
FEVER: 0
HEMATOLOGIC/LYMPHATIC NEGATIVE: 1
NEUROLOGICAL NEGATIVE: 1
COLOR CHANGE: 1
GASTROINTESTINAL NEGATIVE: 1
PSYCHIATRIC NEGATIVE: 1
FATIGUE: 0

## 2025-07-15 ENCOUNTER — TELEPHONE (OUTPATIENT)
Age: 70
End: 2025-07-15
Payer: MEDICARE

## 2025-07-15 DIAGNOSIS — I82.431 ACUTE DEEP VEIN THROMBOSIS (DVT) OF RIGHT POPLITEAL VEIN (MULTI): ICD-10-CM

## 2025-07-15 DIAGNOSIS — I82.409 ACUTE DEEP VEIN THROMBOSIS (DVT) OF LOWER EXTREMITY, UNSPECIFIED LATERALITY, UNSPECIFIED VEIN: Primary | ICD-10-CM

## 2025-07-15 RX ORDER — APIXABAN 5 MG (74)
KIT ORAL
Qty: 74 TABLET | Refills: 0 | Status: SHIPPED | OUTPATIENT
Start: 2025-07-15

## 2025-07-15 NOTE — TELEPHONE ENCOUNTER
Per Dr. Lai's instructions, informed them to send patient to the coumadin clinic for follow-up on DVT.

## 2025-07-16 ENCOUNTER — TELEPHONE (OUTPATIENT)
Age: 70
End: 2025-07-16
Payer: MEDICARE

## 2025-07-16 NOTE — TELEPHONE ENCOUNTER
[facility] CALLED.  THEY FAXED OVER A REFERRAL CONTRACT THAT WILL ALSO NEED YOU TO SIGN .   THEY ALSO  CAN NOT SEND IN 1ST PRESCRIPTION FOR COUMADIN.   THEY WILL NEED YOU TO SEND IN  1 ST  PRESCRIPTION FOR COUMADIN.   [facility]

## 2025-07-16 NOTE — TELEPHONE ENCOUNTER
Was he not able to get the eliquis?  I did sign a referral yesterday, did they send something else?  If he is starting coumadin what dose do they want me to send in?

## 2025-07-17 NOTE — TELEPHONE ENCOUNTER
SPOKE TO PATIENT'S BROTHER FERNANDEZ.   PATIENT PICKED UP  ELEIQUIS  AND  STARTED ON IT.    FERNANDEZ IS NOT SURE IF  HIS BROTHER CAN AFFORD THE COST OF MEDICINE MONTHLY.   WILL LET US KNOW BEFORE HE RUNS OUT OF ELIQUIS.    I SPOKE TO CHARLES AT [facility] COUMADIN [facility].    THEY WILL HOLD ONTO REFERRAL.

## 2025-07-22 DIAGNOSIS — I82.401 ACUTE DEEP VEIN THROMBOSIS (DVT) OF RIGHT LOWER EXTREMITY, UNSPECIFIED VEIN: Primary | ICD-10-CM

## 2025-07-29 NOTE — PROGRESS NOTES
CHIEF COMPLAINT  Referred by PCP, Gwen Donovan for acute DVT    HISTORY OF PRESENT ILLNESS    Harry Hassan is a 69-year-old male with a history of HTN, cardiac murmur, HLD, thrombocytopenia, obesity, BPH, anemia, and sleep apnea referred by PCP for acute DVT to RLE    Patient underwent venous duplex to E on 7/15/25 secondary to swelling revealing an acute DVT in the prox to distal popliteal vein of RLE; chronic DVT noted in right femoral veins. Patient was started on DOAC therapy.     Patient presents with his brother who is POA. They both report the patient had a prior DVT back in 1999 after prolonged travel. They are unaware of any other DVTs between 1999 and July, 2025. He denies any recent travel, surgery, or active cancer. He does reside by himself in his apartment and is quite sedentary. He did suffer a fall with forehead laceration on 7/26/25. His CT of the head was negative for any intracranial hemorrhage. Both patient and brother report no recurrent fall hx and even cannot remember the last time the patient fell. They both report his right lower leg swelling and erythema has drastically improved. He is unable to wear compression stockings due to living alone and difficulty even putting on regular socks.         Past Medical, Surgical, and Family History reviewed and updated in chart.     Reviewed all medications by prescribing practitioner or clinical pharmacist (such as prescriptions, OTCs, herbal therapies and supplements) and documented in the medical record.    Past Medical History  Medical History[1]    Social History  Social History[2]    Family History   Family History[3]     Allergies:  RX Allergies[4]     Outpatient Medications:  Current Outpatient Medications   Medication Instructions    apixaban (Eliquis DVT-PE Treat 30D Start) 5 mg (74 tabs) tablet Take 2 tablets (10 mg) by mouth 2 times a day for 7 days, then take 1 tablet (5 mg) by mouth 2 times a day.    aspirin 81 mg, oral, Daily     "benazepril (LOTENSIN) 20 mg, oral, Daily    cholecalciferol (VITAMIN D-3) 1,000 Units, oral, Daily    EPINEPHrine (AUVI-Q) 0.15 mg, intramuscular, Once as needed    furosemide (LASIX) 40 mg, oral, Daily    metoprolol succinate XL (TOPROL-XL) 50 mg, oral, Daily    multivit-min/folic/vit K/lycop (ONE-A-DAY MEN'S 50 PLUS ORAL) 1 tablet, Daily    nystatin (Mycostatin) cream Topical, 2 times daily, apply to affected area until healed.    pravastatin (PRAVACHOL) 40 mg, oral, Daily    tamsulosin (FLOMAX) 0.4 mg, oral, Nightly    triamcinolone (Kenalog) 0.1 % cream Topical, 2 times daily, Apply to affected area 1-2 times daily as needed.          Labs:   CMP:  Recent Labs     11/14/24  1122 10/05/21  1530 08/10/21  1520 07/26/21  1525 07/14/21  1300 07/01/21  0527 06/30/21  0532 06/29/21  0533 06/28/21  0543 06/27/21  0518     --  145 148* 148* 141 143   < > 142 142   K 4.4  --  3.6 3.8 4.4 3.8 4.9   < > 4.2 4.1     --  100 102 102 96* 97*   < > 95* 103   CO2 34*  --  39* 41* 39* 40* 44*   < > 43* 34*   ANIONGAP  --   --  10 9* 11 9* 7*   < > 8* 9*   BUN 20 22 20 21 18 34* 26*   < > 16 15   CREATININE 1.24 1.33* 1.94* 2.06* 1.47* 1.51* 1.44*   < > 1.11 1.03   EGFR 64  --   --   --   --   --   --   --   --   --    MG  --   --   --   --   --   --   --   --  2.18 2.05    < > = values in this interval not displayed.     Recent Labs     11/14/24  1122 06/30/21  0532 06/26/21  1445 06/25/21  1503   ALBUMIN 3.8 3.3* 3.3* 3.5   ALKPHOS 53 73 92 97   ALT 17 33 21 24   AST 19 81* 25 28   BILITOT 0.9 1.0 0.8 0.8     CBC:  Recent Labs     07/07/25  0829 06/30/21  0533 06/29/21  0533 06/28/21  0543 06/26/21  1445   WBC 6.7 9.7 8.4 8.2 6.0   HGB 16.1 16.2 16.8 17.3 15.2   HCT 50.3* 51.2 53.1* 52.7* 47.4   PLT 92* 108* 110* 122* 108*   .0* 100 98 98 98     COAG:   Recent Labs     06/27/21  0814   DDIMERVTE 1,481*     ABO: No results for input(s): \"ABO\" in the last 20559 hours.  HEME/ENDO:  Recent Labs     07/07/25  0829 " "06/25/21  1503 04/24/21  1124 10/26/20  1203   TSH  --  1.30  --   --    HGBA1C 6.6*  --  6.2* 6.2*      CARDIAC:   Recent Labs     06/26/21  1445   *     Recent Labs     11/14/24  1122   CHOL 126   HDL 50.0   TRIG 81     MICRO: No results for input(s): \"ESR\", \"CRP\", \"PROCAL\" in the last 88820 hours.  No results found for the last 90 days.    Notable Studies: imaging personally reviewed   EKG:No results found for this or any previous visit (from the past 4464 hours).  Echocardiogram:   [place] [address]  Phone [phone], Fax [phone]    TRANSTHORACIC ECHOCARDIOGRAM REPORT      Patient Name:     HUANG ROSSI  Reading Physician:   [id number] Saul Doherty MD  Study Date:       6/28/2021          Referring Physician: [id number] ARCEO  MRN/PID:          [id number]           PCP:  Accession/Order#: [id number]          Department Location: [address]  YOB: 1955         Fellow:  Gender:           M                  Nurse:  Admit Date:       6/26/2021          Sonographer:         MEHREEN  Admission Status: Inpatient -        Additional Staff:  Routine  Height:           162.00 cm          CC Report to:  Weight:           126.00 kg          Study Type:          Echocardiogram  BSA:              2.24 m2  Blood Pressure: 146 /94 mmHg    Diagnosis/ICD: I50.20-Unspecified systolic (congestive) heart failure (CHF)  Indication:    Congestive Heart Failure  Procedure/CPT: Echo Complete w Full Doppler-[id number]    Patient History:  Pertinent History: No previous echo.    Study Detail: The following Echo studies were performed: 2D, M-Mode, Doppler and  color flow. Technically challenging study due to poor acoustic  windows and body habitus. Definity used as a contrast agent for  endocardial border definition. Total contrast used for this  procedure was 2 mL via IV push. The patient was awake.      PHYSICIAN " INTERPRETATION:  Left Ventricle: The left ventricular systolic function is normal, with an estimated ejection fraction of 65-70%. There are no regional wall motion abnormalities. The left ventricular cavity size is normal. Left ventricular diastolic filling was indeterminate.  Left Atrium: The left atrium is mildly dilated.  Right Ventricle: The right ventricle was not well visualized. Unable to determine right ventricular systolic function.  Right Atrium: The right atrium was not well visualized.  Aortic Valve: The aortic valve appears normal. There is no evidence of aortic valve regurgitation. The peak instantaneous gradient of the aortic valve is 14.1 mmHg. The mean gradient of the aortic valve is 8.0 mmHg. Aortic valve appears to open well in systole; number of leaflets could not be adequately visualized.  Mitral Valve: The mitral valve is normal in structure. There is moderate mitral valve regurgitation. Jet of mitral regurgitation is eccentric and incompletely visualized. Recommend alternative modality (transesophageal echocardiogram/cardiac MRI for further evaluation).  Tricuspid Valve: The tricuspid valve was not well visualized. No evidence of tricuspid regurgitation.  Pulmonic Valve: The pulmonic valve is structurally normal. There is moderate pulmonic valve regurgitation.  Pericardium: There is no pericardial effusion noted.  Aorta: The aortic root is normal.      CONCLUSIONS:  1. The left ventricular systolic function is normal with a 65-70% estimated ejection fraction.  2. There is moderate pulmonic valve regurgitation.  3. At least moderate mitral regurgitation.  4. Jet of mitral regurgitation is eccentric and incompletely visualized. Recommend alternative modality (transesophageal echocardiogram/cardiac MRI for further evaluation).    QUANTITATIVE DATA SUMMARY:  2D MEASUREMENTS:  Normal Ranges:  Ao Root d:     2.70 cm   (2.0-3.7cm)  LAs:           3.90 cm   (2.7-4.0cm)  IVSd:          1.15 cm    (0.6-1.1cm)  LVPWd:         1.16 cm   (0.6-1.1cm)  LVIDd:         4.73 cm   (3.9-5.9cm)  LVIDs:         2.85 cm  LV Mass Index: 90.4 g/m2  LV % FS        39.7 %    LA VOLUME:  Normal Ranges:  LA Vol A4C:        58.2 ml    (22+/-6mL/m2)  LA Vol A2C:        90.9 ml  LA Vol BP:         75.2 ml  LA Vol Index A4C:  25.9ml/m2  LA Vol Index A2C:  40.5 ml/m2  LA Vol Index BP:   33.5 ml/m2  LA Area A4C:       21.0 cm2  LA Area A2C:       25.4 cm2  LA Major Axis A4C: 6.4 cm  LA Major Axis A2C: 6.0 cm  LA Volume Index:   38.1 ml/m2  LA Vol A4C:        56.5 ml  LA Vol A2C:        85.3 ml    M-MODE MEASUREMENTS:  Normal Ranges:  AoV Exc: 1.50 cm (1.5-2.5cm)    AORTA MEASUREMENTS:  Normal Ranges:  AoV Exc: 1.50 cm (1.5-2.5cm)    LV SYSTOLIC FUNCTION BY 2D PLANIMETRY (MOD):  Normal Ranges:  EF-A4C View: 62.3 % (>55%)  EF-A2C View: 48.5 %  EF-Biplane:  53.7 %    LV DIASTOLIC FUNCTION:  Normal Ranges:  MV Peak E:    1.49 m/s (0.7-1.2 m/s)  MV Peak A:    1.00 m/s (0.42-0.7 m/s)  E/A Ratio:    1.49     (1.0-2.2)  MV e'         0.11 m/s (>8.0)  MV lateral e' 0.12 m/s  MV medial e'  0.12 m/s  E/e' Ratio:   13.55    (<8.0)    MITRAL VALVE:  Normal Ranges:  MV DT: 211 msec (150-240msec)    MITRAL INSUFFICIENCY:  Normal Ranges:  MR Vmax: 476.00 cm/s    AORTIC VALVE:  Normal Ranges:  AoV Vmax:                1.88 m/s  (<1.7m/s)  AoV Peak P.1 mmHg (<20mmHg)  AoV Mean P.0 mmHg  (1.7-11.5mmHg)  LVOT Max Jaxon:            1.41 m/s  (<1.1m/s)  AoV VTI:                 47.70 cm  (18-25cm)  LVOT VTI:                30.70 cm  LVOT Diameter:           1.90 cm   (1.8-2.4cm)  AoV Area, VTI:           1.82 cm2  (2.5-5.5cm2)  AoV Area,Vmax:           2.13 cm2  (2.5-4.5cm2)  AoV Dimensionless Index: 0.64    RIGHT VENTRICLE:  RV 1   4.81 cm  RV 2   2.79 cm  RV 3   7.65 cm  TAPSE: 24.8 mm  RV s'  0.16 m/s    PULMONIC VALVE:  Normal Ranges:  PV Accel Time: 56 msec  (>120ms)  PV Max Jaxon:    1.2 m/s  (0.6-0.9m/s)  PV Max PG:      6.0 mmHg      [id number] Saul Doherty MD  Electronically signed on 6/28/2021 at 10:25:22 AM    Stress Testing: No results found for this or any previous visit from the past 1825 days.    Cardiac Catheterization: No results found for this or any previous visit from the past 1825 days.  No results found for this or any previous visit from the past 3650 days.         REVIEW OF SYSTEMS  A 10-point system review was completed and was negative except as noted in the HPI.      VITALS  Vitals:    08/05/25 1306   BP: 118/68       PHYSICAL EXAM  General: awake, alert and oriented. No acute distress.   Skin: Skin is warm, dry and intact without rashes or lesions.  HEENT: normocephalic, atraumatic; conjunctivae are clear without exudates or hemorrhage. Sclera is non-icteric. Eyelids are normal in appearance without swelling or lesions. Hearing intact. Nares are patent bilaterally. Moist mucous membranes.   Cardiovascular: heart rate and rhythm are normal. Murmur present  Respiratory: bilateral lung sounds clear to auscultations without rales, rhonchi, or wheezes. No accessory muscle use or stridor  Musculoskeletal: ROM intact, no deformities  Extremities: +1 to BLLE; chronic venous stasis to RLE  Neurological: no focal deficits; gait steady  Psychiatric: calm          ASSESSMENT AND PLAN  Assessment/Plan   Diagnoses and all orders for this visit:  Acute deep vein thrombosis (DVT) of popliteal vein of right lower extremity  -Duplex also showing chronic DVT of right femoral vein. Acute DVT appears provoked by sedentary lifestyle which is a persistent risk factor. Recommend DOAC therapy for minimum of 6 months and then repeating venous duplex of RLE and then will risk stratify at that time, however, sedentary lifestyle not likely to change and therefore patient likely will need indefinite anticoagulation  -Urgent referral for clinical pharmacist sent for cost of DOAC  -Continue leg elevation when sitting; likely not able to wear  compression socking due to difficulty donning  -Brother and patient educated on common side effects of anti-coagulation and to go to the nearest ER if he suffers another fall with striking of the head with both verbalizing understanding   -HAS-BLED Score of 2      Murmur, cardiac  -Previous echo in 2021 showing moderate AZ and MR; patient largely asymptomatic but does have a significant cardiac family hx; will obtain surveillance echo in 6 months  -IOEKG showing NSR        RTC: 6 months       Thank you for allowing me to participate in the care of this patient. Please reach me out if you have any questions or if you need any clarifications regarding the patient's care.    Shikha Guzman, MEÑO, APRN, FNP-C  Division of Cardiovascular Medicine  [facility]  [facility]         [1]   Past Medical History:  Diagnosis Date    Acute respiratory failure with hypoxia 07/15/2021    Acute respiratory failure with hypoxia    Renal insufficiency 02/17/2023   [2]   Social History  Tobacco Use    Smoking status: Never    Smokeless tobacco: Never   Vaping Use    Vaping status: Never Used   Substance Use Topics    Alcohol use: Never    Drug use: Never   [3]   Family History  Problem Relation Name Age of Onset    Diabetes Mother      Heart failure Mother      Hyperlipidemia Mother      Hypertension Mother      Heart failure Father      Heart failure Sibling      Heart failure Other Grandmother     Heart failure Other Grandfather    [4]   Allergies  Allergen Reactions    Venom-Wasp Anaphylaxis    Bee Venom Protein (Honey Bee) Unknown

## 2025-08-04 ENCOUNTER — APPOINTMENT (OUTPATIENT)
Dept: PRIMARY CARE | Facility: CLINIC | Age: 70
End: 2025-08-04
Payer: MEDICARE

## 2025-08-04 VITALS
BODY MASS INDEX: 41.68 KG/M2 | SYSTOLIC BLOOD PRESSURE: 138 MMHG | HEART RATE: 76 BPM | DIASTOLIC BLOOD PRESSURE: 70 MMHG | HEIGHT: 64 IN | WEIGHT: 244.13 LBS

## 2025-08-04 DIAGNOSIS — Z48.02 VISIT FOR SUTURE REMOVAL: Primary | ICD-10-CM

## 2025-08-04 DIAGNOSIS — Z51.89 VISIT FOR WOUND CHECK: ICD-10-CM

## 2025-08-04 DIAGNOSIS — Z23 NEED FOR TDAP VACCINATION: ICD-10-CM

## 2025-08-04 DIAGNOSIS — D69.6 THROMBOCYTOPENIA: ICD-10-CM

## 2025-08-04 PROBLEM — I82.401 ACUTE DEEP VEIN THROMBOSIS (DVT) OF RIGHT LOWER EXTREMITY: Status: ACTIVE | Noted: 2025-07-16

## 2025-08-04 PROCEDURE — 99213 OFFICE O/P EST LOW 20 MIN: CPT | Performed by: NURSE PRACTITIONER

## 2025-08-04 PROCEDURE — 3008F BODY MASS INDEX DOCD: CPT | Performed by: NURSE PRACTITIONER

## 2025-08-04 PROCEDURE — 90471 IMMUNIZATION ADMIN: CPT | Performed by: NURSE PRACTITIONER

## 2025-08-04 PROCEDURE — 1160F RVW MEDS BY RX/DR IN RCRD: CPT | Performed by: NURSE PRACTITIONER

## 2025-08-04 PROCEDURE — 1036F TOBACCO NON-USER: CPT | Performed by: NURSE PRACTITIONER

## 2025-08-04 PROCEDURE — G2211 COMPLEX E/M VISIT ADD ON: HCPCS | Performed by: NURSE PRACTITIONER

## 2025-08-04 PROCEDURE — 90715 TDAP VACCINE 7 YRS/> IM: CPT | Performed by: NURSE PRACTITIONER

## 2025-08-04 PROCEDURE — 3075F SYST BP GE 130 - 139MM HG: CPT | Performed by: NURSE PRACTITIONER

## 2025-08-04 PROCEDURE — 1159F MED LIST DOCD IN RCRD: CPT | Performed by: NURSE PRACTITIONER

## 2025-08-04 PROCEDURE — 3078F DIAST BP <80 MM HG: CPT | Performed by: NURSE PRACTITIONER

## 2025-08-04 ASSESSMENT — ENCOUNTER SYMPTOMS
CHEST TIGHTNESS: 0
FATIGUE: 0
MYALGIAS: 0
CONSTIPATION: 0
ARTHRALGIAS: 0
VOMITING: 0
COLOR CHANGE: 0
DYSURIA: 0
PSYCHIATRIC NEGATIVE: 1
SHORTNESS OF BREATH: 0
PALPITATIONS: 0
NAUSEA: 0
LIGHT-HEADEDNESS: 0
DIZZINESS: 0
DIARRHEA: 0
ABDOMINAL PAIN: 0
BLOOD IN STOOL: 0
WOUND: 1
HEADACHES: 0

## 2025-08-04 NOTE — PROGRESS NOTES
"Patient ID: Silvio Hassan \"Paddy" is a 69 y.o. male.    Procedures  Subjective   Harry Hassan is a 69 y.o. male who obtained a laceration 9 days ago, which required closure with un-interrupted sutures. Mechanism of injury: fall. He denies pain, redness, or drainage from the wound. His last tetanus was  years ago.    Objective   /70   Pulse 76   Ht 1.626 m (5' 4\")   Wt 111 kg (244 lb 2 oz)   BMI 41.90 kg/m²   Injury exam:  A 2 cm laceration noted on the forehead is healing well, without evidence of infection.    Assessment/Plan   Laceration is healing well, without evidence of infection.    1.one  Un-interrupted suture was removed.  2. Wound care discussed.  3. Follow up in november.  "

## 2025-08-04 NOTE — PROGRESS NOTES
"Subjective   Patient ID: Harry Hassan is a 69 y.o. male who presents for Suture / Staple Removal.    HPI   Harry returns for suture removal. He fell on July 26th and received a 2 cm laceration to forehead. He was sutured with an un-interrupted suture to his wound. Wound shows no s/sx of infection. Scabbed over. Will remove sutures today (see procedure note).       Review of Systems   Constitutional:  Negative for fatigue.   HENT: Negative.     Respiratory:  Negative for chest tightness and shortness of breath.    Cardiovascular:  Negative for chest pain, palpitations and leg swelling.   Gastrointestinal:  Negative for abdominal pain, blood in stool, constipation, diarrhea, nausea and vomiting.   Genitourinary:  Negative for dysuria.   Musculoskeletal:  Negative for arthralgias and myalgias.   Skin:  Positive for wound (2 cm healing laceration to forehead). Negative for color change.        Bruising below bilateral eyes, and to right side of forehead.  Discoloration to right lower leg   Neurological:  Negative for dizziness, light-headedness and headaches.   Psychiatric/Behavioral: Negative.         Objective   /70   Pulse 76   Ht 1.626 m (5' 4\")   Wt 111 kg (244 lb 2 oz)   BMI 41.90 kg/m²     Physical Exam  Vitals and nursing note reviewed.   Constitutional:       Appearance: Normal appearance.   HENT:      Head: Normocephalic.        Comments: 2 cm healing laceration with well approx. W/out s/sx of infection. Some old blood noted to wound. Suture removed. Tolerated well.   Bruising noted to bilateral eyes-underneath/cheeks    Also noted to have hematoma to right side of forehead, with purple/yellow colored bruising noted around lump.    Cardiovascular:      Rate and Rhythm: Normal rate and regular rhythm.      Pulses: Normal pulses.      Heart sounds: Normal heart sounds.   Pulmonary:      Effort: Pulmonary effort is normal.      Breath sounds: Normal breath sounds.     Skin:     General: Skin is warm " and dry.      Findings: Ecchymosis present.           Comments: Discoloration to left leg, swelling slightly improved from last visit     Neurological:      General: No focal deficit present.      Mental Status: He is alert and oriented to person, place, and time.     Psychiatric:         Mood and Affect: Mood normal.         Behavior: Behavior normal.         Thought Content: Thought content normal.         Judgment: Judgment normal.         Assessment/Plan   Problem List Items Addressed This Visit    None  Visit Diagnoses         Codes      Visit for suture removal    -  Primary Z48.02      Visit for wound check     Z51.89      Need for Tdap vaccination     Z23    Relevant Orders    Tdap vaccine, age 7 years and older (Completed)             Follow up in November for chronic care. Return precautions discussed. He verbalized understanding. Has not had a Tdap in many years, will give him this today.    (See procedure note for suture removal)    For any new medications that were prescribed today, the patient was educated about their indications for use, administration, frequency and potential side effects of the medication.

## 2025-08-05 ENCOUNTER — APPOINTMENT (OUTPATIENT)
Dept: CARDIOLOGY | Facility: CLINIC | Age: 70
End: 2025-08-05
Payer: MEDICARE

## 2025-08-05 VITALS
SYSTOLIC BLOOD PRESSURE: 118 MMHG | DIASTOLIC BLOOD PRESSURE: 68 MMHG | BODY MASS INDEX: 41.66 KG/M2 | WEIGHT: 244 LBS | HEIGHT: 64 IN

## 2025-08-05 DIAGNOSIS — I82.431 ACUTE DEEP VEIN THROMBOSIS (DVT) OF POPLITEAL VEIN OF RIGHT LOWER EXTREMITY: Primary | ICD-10-CM

## 2025-08-05 DIAGNOSIS — I82.4Y1 ACUTE DEEP VEIN THROMBOSIS (DVT) OF PROXIMAL VEIN OF RIGHT LOWER EXTREMITY: Primary | ICD-10-CM

## 2025-08-05 DIAGNOSIS — R01.1 MURMUR, CARDIAC: ICD-10-CM

## 2025-08-05 PROCEDURE — 99204 OFFICE O/P NEW MOD 45 MIN: CPT

## 2025-08-05 PROCEDURE — G2211 COMPLEX E/M VISIT ADD ON: HCPCS

## 2025-08-05 PROCEDURE — 1159F MED LIST DOCD IN RCRD: CPT

## 2025-08-05 PROCEDURE — 1036F TOBACCO NON-USER: CPT

## 2025-08-05 PROCEDURE — 3078F DIAST BP <80 MM HG: CPT

## 2025-08-05 PROCEDURE — 1160F RVW MEDS BY RX/DR IN RCRD: CPT

## 2025-08-05 PROCEDURE — 93000 ELECTROCARDIOGRAM COMPLETE: CPT

## 2025-08-05 PROCEDURE — 3074F SYST BP LT 130 MM HG: CPT

## 2025-08-05 PROCEDURE — RXMED WILLOW AMBULATORY MEDICATION CHARGE

## 2025-08-05 PROCEDURE — 3008F BODY MASS INDEX DOCD: CPT

## 2025-08-05 NOTE — LETTER
August 5, 2025     Gwen Donovan, APRN-CNP  [address] [facility]  [address]  [facility] [address]    Patient: Harry Hassan   YOB: 1955   Date of Visit: 8/5/2025       Dear Dr. Gwen Donovan, APRN-CNP:    Thank you for referring Harry Hassan to me for evaluation. Below are my notes for this consultation.  If you have questions, please do not hesitate to call me. I look forward to following your patient along with you.       Sincerely,     Shikha Guzman, SCOTT-CNP, DNP      CC: No Recipients  ______________________________________________________________________________________          CHIEF COMPLAINT  Referred by PCP, Gwen Donovan for acute DVT    HISTORY OF PRESENT ILLNESS    Harry Hassan is a 69-year-old male with a history of HTN, cardiac murmur, HLD, thrombocytopenia, obesity, BPH, anemia, and sleep apnea referred by PCP for acute DVT to RLE    Patient underwent venous duplex to RLE on 7/15/25 secondary to swelling revealing an acute DVT in the prox to distal popliteal vein of RLE; chronic DVT noted in right femoral veins. Patient was started on DOAC therapy.     Patient presents with his brother who is POA. They both report the patient had a prior DVT back in 1999 after prolonged travel. They are unaware of any other DVTs between 1999 and July, 2025. He denies any recent travel, surgery, or active cancer. He does reside by himself in his apartment and is quite sedentary. He did suffer a fall with forehead laceration on 7/26/25. His CT of the head was negative for any intracranial hemorrhage. Both patient and brother report no recurrent fall hx and even cannot remember the last time the patient fell. They both report his right lower leg swelling and erythema has drastically improved. He is unable to wear compression stockings due to living alone and difficulty even putting on regular socks.         Past Medical, Surgical, and Family History reviewed and updated in chart.     Reviewed all medications by  prescribing practitioner or clinical pharmacist (such as prescriptions, OTCs, herbal therapies and supplements) and documented in the medical record.    Past Medical History  Medical History[1]    Social History  Social History[2]    Family History   Family History[3]     Allergies:  RX Allergies[4]     Outpatient Medications:  Current Outpatient Medications   Medication Instructions   • apixaban (Eliquis DVT-PE Treat 30D Start) 5 mg (74 tabs) tablet Take 2 tablets (10 mg) by mouth 2 times a day for 7 days, then take 1 tablet (5 mg) by mouth 2 times a day.   • aspirin 81 mg, oral, Daily   • benazepril (LOTENSIN) 20 mg, oral, Daily   • cholecalciferol (VITAMIN D-3) 1,000 Units, oral, Daily   • EPINEPHrine (AUVI-Q) 0.15 mg, intramuscular, Once as needed   • furosemide (LASIX) 40 mg, oral, Daily   • metoprolol succinate XL (TOPROL-XL) 50 mg, oral, Daily   • multivit-min/folic/vit K/lycop (ONE-A-DAY MEN'S 50 PLUS ORAL) 1 tablet, Daily   • nystatin (Mycostatin) cream Topical, 2 times daily, apply to affected area until healed.   • pravastatin (PRAVACHOL) 40 mg, oral, Daily   • tamsulosin (FLOMAX) 0.4 mg, oral, Nightly   • triamcinolone (Kenalog) 0.1 % cream Topical, 2 times daily, Apply to affected area 1-2 times daily as needed.          Labs:   CMP:  Recent Labs     11/14/24  1122 10/05/21  1530 08/10/21  1520 07/26/21  1525 07/14/21  1300 07/01/21  0527 06/30/21  0532 06/29/21  0533 06/28/21  0543 06/27/21  0518     --  145 148* 148* 141 143   < > 142 142   K 4.4  --  3.6 3.8 4.4 3.8 4.9   < > 4.2 4.1     --  100 102 102 96* 97*   < > 95* 103   CO2 34*  --  39* 41* 39* 40* 44*   < > 43* 34*   ANIONGAP  --   --  10 9* 11 9* 7*   < > 8* 9*   BUN 20 22 20 21 18 34* 26*   < > 16 15   CREATININE 1.24 1.33* 1.94* 2.06* 1.47* 1.51* 1.44*   < > 1.11 1.03   EGFR 64  --   --   --   --   --   --   --   --   --    MG  --   --   --   --   --   --   --   --  2.18 2.05    < > = values in this interval not displayed.  "    Recent Labs     11/14/24  1122 06/30/21  0532 06/26/21  1445 06/25/21  1503   ALBUMIN 3.8 3.3* 3.3* 3.5   ALKPHOS 53 73 92 97   ALT 17 33 21 24   AST 19 81* 25 28   BILITOT 0.9 1.0 0.8 0.8     CBC:  Recent Labs     07/07/25  0829 06/30/21  0533 06/29/21  0533 06/28/21  0543 06/26/21  1445   WBC 6.7 9.7 8.4 8.2 6.0   HGB 16.1 16.2 16.8 17.3 15.2   HCT 50.3* 51.2 53.1* 52.7* 47.4   PLT 92* 108* 110* 122* 108*   .0* 100 98 98 98     COAG:   Recent Labs     06/27/21  0814   DDIMERVTE 1,481*     ABO: No results for input(s): \"ABO\" in the last 19647 hours.  HEME/ENDO:  Recent Labs     07/07/25  0829 06/25/21  1503 04/24/21  1124 10/26/20  1203   TSH  --  1.30  --   --    HGBA1C 6.6*  --  6.2* 6.2*      CARDIAC:   Recent Labs     06/26/21  1445   *     Recent Labs     11/14/24  1122   CHOL 126   HDL 50.0   TRIG 81     MICRO: No results for input(s): \"ESR\", \"CRP\", \"PROCAL\" in the last 71708 hours.  No results found for the last 90 days.    Notable Studies: imaging personally reviewed   EKG:No results found for this or any previous visit (from the past 4464 hours).  Echocardiogram:   [place] [address]  Phone [phone], Fax [phone]    TRANSTHORACIC ECHOCARDIOGRAM REPORT      Patient Name:     HUANG HARTLEY ENID  Reading Physician:   [id number] Saul Doherty MD  Study Date:       6/28/2021          Referring Physician: [id number] EDITH ARCEO  MRN/PID:          [id number]           PCP:  Accession/Order#: [id number]          Department Location: [address]  YOB: 1955         Fellow:  Gender:           M                  Nurse:  Admit Date:       6/26/2021          Sonographer:         MEHREEN  Admission Status: Inpatient -        Additional Staff:  Routine  Height:           162.00 cm          CC Report to:  Weight:           126.00 kg          Study Type:          Echocardiogram  BSA:              2.24 " m2  Blood Pressure: 146 /94 mmHg    Diagnosis/ICD: I50.20-Unspecified systolic (congestive) heart failure (CHF)  Indication:    Congestive Heart Failure  Procedure/CPT: Echo Complete w Full Doppler-[id number]    Patient History:  Pertinent History: No previous echo.    Study Detail: The following Echo studies were performed: 2D, M-Mode, Doppler and  color flow. Technically challenging study due to poor acoustic  windows and body habitus. Definity used as a contrast agent for  endocardial border definition. Total contrast used for this  procedure was 2 mL via IV push. The patient was awake.      PHYSICIAN INTERPRETATION:  Left Ventricle: The left ventricular systolic function is normal, with an estimated ejection fraction of 65-70%. There are no regional wall motion abnormalities. The left ventricular cavity size is normal. Left ventricular diastolic filling was indeterminate.  Left Atrium: The left atrium is mildly dilated.  Right Ventricle: The right ventricle was not well visualized. Unable to determine right ventricular systolic function.  Right Atrium: The right atrium was not well visualized.  Aortic Valve: The aortic valve appears normal. There is no evidence of aortic valve regurgitation. The peak instantaneous gradient of the aortic valve is 14.1 mmHg. The mean gradient of the aortic valve is 8.0 mmHg. Aortic valve appears to open well in systole; number of leaflets could not be adequately visualized.  Mitral Valve: The mitral valve is normal in structure. There is moderate mitral valve regurgitation. Jet of mitral regurgitation is eccentric and incompletely visualized. Recommend alternative modality (transesophageal echocardiogram/cardiac MRI for further evaluation).  Tricuspid Valve: The tricuspid valve was not well visualized. No evidence of tricuspid regurgitation.  Pulmonic Valve: The pulmonic valve is structurally normal. There is moderate pulmonic valve regurgitation.  Pericardium: There is no pericardial  effusion noted.  Aorta: The aortic root is normal.      CONCLUSIONS:  1. The left ventricular systolic function is normal with a 65-70% estimated ejection fraction.  2. There is moderate pulmonic valve regurgitation.  3. At least moderate mitral regurgitation.  4. Jet of mitral regurgitation is eccentric and incompletely visualized. Recommend alternative modality (transesophageal echocardiogram/cardiac MRI for further evaluation).    QUANTITATIVE DATA SUMMARY:  2D MEASUREMENTS:  Normal Ranges:  Ao Root d:     2.70 cm   (2.0-3.7cm)  LAs:           3.90 cm   (2.7-4.0cm)  IVSd:          1.15 cm   (0.6-1.1cm)  LVPWd:         1.16 cm   (0.6-1.1cm)  LVIDd:         4.73 cm   (3.9-5.9cm)  LVIDs:         2.85 cm  LV Mass Index: 90.4 g/m2  LV % FS        39.7 %    LA VOLUME:  Normal Ranges:  LA Vol A4C:        58.2 ml    (22+/-6mL/m2)  LA Vol A2C:        90.9 ml  LA Vol BP:         75.2 ml  LA Vol Index A4C:  25.9ml/m2  LA Vol Index A2C:  40.5 ml/m2  LA Vol Index BP:   33.5 ml/m2  LA Area A4C:       21.0 cm2  LA Area A2C:       25.4 cm2  LA Major Axis A4C: 6.4 cm  LA Major Axis A2C: 6.0 cm  LA Volume Index:   38.1 ml/m2  LA Vol A4C:        56.5 ml  LA Vol A2C:        85.3 ml    M-MODE MEASUREMENTS:  Normal Ranges:  AoV Exc: 1.50 cm (1.5-2.5cm)    AORTA MEASUREMENTS:  Normal Ranges:  AoV Exc: 1.50 cm (1.5-2.5cm)    LV SYSTOLIC FUNCTION BY 2D PLANIMETRY (MOD):  Normal Ranges:  EF-A4C View: 62.3 % (>55%)  EF-A2C View: 48.5 %  EF-Biplane:  53.7 %    LV DIASTOLIC FUNCTION:  Normal Ranges:  MV Peak E:    1.49 m/s (0.7-1.2 m/s)  MV Peak A:    1.00 m/s (0.42-0.7 m/s)  E/A Ratio:    1.49     (1.0-2.2)  MV e'         0.11 m/s (>8.0)  MV lateral e' 0.12 m/s  MV medial e'  0.12 m/s  E/e' Ratio:   13.55    (<8.0)    MITRAL VALVE:  Normal Ranges:  MV DT: 211 msec (150-240msec)    MITRAL INSUFFICIENCY:  Normal Ranges:  MR Vmax: 476.00 cm/s    AORTIC VALVE:  Normal Ranges:  AoV Vmax:                1.88 m/s  (<1.7m/s)  AoV Peak PG:              14.1 mmHg (<20mmHg)  AoV Mean P.0 mmHg  (1.7-11.5mmHg)  LVOT Max Jaxon:            1.41 m/s  (<1.1m/s)  AoV VTI:                 47.70 cm  (18-25cm)  LVOT VTI:                30.70 cm  LVOT Diameter:           1.90 cm   (1.8-2.4cm)  AoV Area, VTI:           1.82 cm2  (2.5-5.5cm2)  AoV Area,Vmax:           2.13 cm2  (2.5-4.5cm2)  AoV Dimensionless Index: 0.64    RIGHT VENTRICLE:  RV 1   4.81 cm  RV 2   2.79 cm  RV 3   7.65 cm  TAPSE: 24.8 mm  RV s'  0.16 m/s    PULMONIC VALVE:  Normal Ranges:  PV Accel Time: 56 msec  (>120ms)  PV Max Jaxon:    1.2 m/s  (0.6-0.9m/s)  PV Max P.0 mmHg      [id number] Saul Doherty MD  Electronically signed on 2021 at 10:25:22 AM    Stress Testing: No results found for this or any previous visit from the past 1825 days.    Cardiac Catheterization: No results found for this or any previous visit from the past 1825 days.  No results found for this or any previous visit from the past 3650 days.         REVIEW OF SYSTEMS  A 10-point system review was completed and was negative except as noted in the HPI.      VITALS  Vitals:    25 1306   BP: 118/68       PHYSICAL EXAM  General: awake, alert and oriented. No acute distress.   Skin: Skin is warm, dry and intact without rashes or lesions.  HEENT: normocephalic, atraumatic; conjunctivae are clear without exudates or hemorrhage. Sclera is non-icteric. Eyelids are normal in appearance without swelling or lesions. Hearing intact. Nares are patent bilaterally. Moist mucous membranes.   Cardiovascular: heart rate and rhythm are normal. Murmur present  Respiratory: bilateral lung sounds clear to auscultations without rales, rhonchi, or wheezes. No accessory muscle use or stridor  Musculoskeletal: ROM intact, no deformities  Extremities: +1 to BLLE; chronic venous stasis to RLE  Neurological: no focal deficits; gait steady  Psychiatric: calm          ASSESSMENT AND PLAN  Assessment/Plan  Diagnoses and all orders for this  visit:  Acute deep vein thrombosis (DVT) of popliteal vein of right lower extremity  -Duplex also showing chronic DVT of right femoral vein. Acute DVT appears provoked by sedentary lifestyle which is a persistent risk factor. Recommend DOAC therapy for minimum of 6 months and then repeating venous duplex of RLE and then will risk stratify at that time, however, sedentary lifestyle not likely to change and therefore patient likely will need indefinite anticoagulation  -Urgent referral for clinical pharmacist sent for cost of DOAC  -Continue leg elevation when sitting; likely not able to wear compression socking due to difficulty donning  -Brother and patient educated on common side effects of anti-coagulation and to go to the nearest ER if he suffers another fall with striking of the head with both verbalizing understanding   -HAS-BLED Score of 2      Murmur, cardiac  -Previous echo in 2021 showing moderate HI and MR; patient largely asymptomatic but does have a significant cardiac family hx; will obtain surveillance echo in 6 months  -IOEKG showing NSR        RTC: 6 months       Thank you for allowing me to participate in the care of this patient. Please reach me out if you have any questions or if you need any clarifications regarding the patient's care.    Shikha Guzman, MEÑO, APRN, FNP-C  Division of Cardiovascular Medicine  [facility]  [facility]         [1]  Past Medical History:  Diagnosis Date   • Acute respiratory failure with hypoxia 07/15/2021    Acute respiratory failure with hypoxia   • Renal insufficiency 02/17/2023   [2]  Social History  Tobacco Use   • Smoking status: Never   • Smokeless tobacco: Never   Vaping Use   • Vaping status: Never Used   Substance Use Topics   • Alcohol use: Never   • Drug use: Never   [3]  Family History  Problem Relation Name Age of Onset   • Diabetes Mother     • Heart failure Mother     • Hyperlipidemia Mother     •  Hypertension Mother     • Heart failure Father     • Heart failure Sibling     • Heart failure Other Grandmother    • Heart failure Other Grandfather    [4]  Allergies  Allergen Reactions   • Venom-Wasp Anaphylaxis   • Bee Venom Protein (Honey Bee) Unknown

## 2025-08-05 NOTE — PROGRESS NOTES
Appointment with clinical pharmacy 8/11/25. Patient is about out of medication. Sent a 1 time refill to [facility] to use free trial and deliver to patient before appointment.

## 2025-08-07 ENCOUNTER — PHARMACY VISIT (OUTPATIENT)
Dept: PHARMACY | Facility: CLINIC | Age: 70
End: 2025-08-07
Payer: COMMERCIAL

## 2025-08-07 NOTE — PROGRESS NOTES
"  Pharmacist Clinic: Cardiology Management    Silvio Hassan \"Harry\" is a 69 y.o. male was referred to Clinical Pharmacy Team for anticoagulation management.     Referring Provider: Shikha Guzman, MER*    THIS IS A NEW PATIENT APPOINTMENT. PATIENT WILL BE ESTABLISHING CARE WITH CLINICAL PHARMACY.    Appointment was completed by *** who was reached at ***.    Allergies Reviewed? Yes    Allergies[1]    Medical History[2]    Medications Ordered Prior to Encounter[3]      RELEVANT LAB RESULTS:  Lab Results   Component Value Date    BILITOT 0.9 11/14/2024    CALCIUM 8.9 11/14/2024    CO2 34 (H) 11/14/2024     11/14/2024    CREATININE 1.24 11/14/2024    GLUCOSE 120 (H) 11/14/2024    ALKPHOS 53 11/14/2024    K 4.4 11/14/2024    PROT 6.0 (L) 11/14/2024     11/14/2024    AST 19 11/14/2024    ALT 17 11/14/2024    BUN 20 11/14/2024    ANIONGAP 10 08/10/2021    MG 2.18 06/28/2021    PHOS 3.9 06/28/2021    ALBUMIN 3.8 11/14/2024     Lab Results   Component Value Date    TRIG 81 11/14/2024    CHOL 126 11/14/2024    LDLCALC 60 11/14/2024    HDL 50.0 11/14/2024     No results found for: \"BMCBC\", \"CBCDIF\"     PHARMACEUTICAL ASSESSMENT:    MEDICATION RECONCILIATION    Was a medication reconciliation completed at this visit? Yes  Home Pharmacy Reviewed? Yes, describe: [facility]     Added:  - ***  Changed:  - ***  Removed:  - ***    Drug Interactions? {YES-DESCRIBE/NO:87912}    Medication Documentation Review Audit       Reviewed by Rosanne Berrios CMA (Medical Assistant) on 08/05/25 at 1308      Medication Order Taking? Sig Documenting Provider Last Dose Status   apixaban (Eliquis DVT-PE Treat 30D Start) 5 mg (74 tabs) tablet [id number] Yes Take 2 tablets (10 mg) by mouth 2 times a day for 7 days, then take 1 tablet (5 mg) by mouth 2 times a day. Keiko Lai MD  Active   aspirin 81 mg EC tablet [id number] Yes Take 1 tablet (81 mg) by mouth once daily. Gwen Donovan APRN-CNP  Active   benazepril " (Lotensin) 20 mg tablet [id number] Yes Take 1 tablet (20 mg) by mouth once daily. ALYSSA Basilio  Active   cholecalciferol (Vitamin D-3) 25 MCG (1000 UT) tablet [id number] Yes Take 1 tablet (1,000 Units) by mouth once daily. ALYSSA Basilio  Active   EPINEPHrine (AUVI-Q) 0.15 mg/0.15 mL inj auto-injector injection [id number] Yes Inject 0.15 mL (0.15 mg) into the muscle 1 time if needed for anaphylaxis for up to 1 dose. Paola Nunn DO  Active   furosemide (Lasix) 40 mg tablet [id number] Yes Take 1 tablet (40 mg) by mouth once daily. ALYSSA Basilio  Active   metoprolol succinate XL (Toprol-XL) 50 mg 24 hr tablet [id number] Yes Take 1 tablet (50 mg) by mouth once daily. ALYSSA Basilio  Active   multivit-min/folic/vit K/lycop (ONE-A-DAY MEN'S 50 PLUS ORAL) [id number] Yes Take 1 tablet by mouth once daily. Historical Provider, MD  Active   nystatin (Mycostatin) cream [id number]  Apply topically 2 times a day. apply to affected area until healed.   Patient not taking: Reported on 8/5/2025    ALYSSA Basilio  Active   pravastatin (Pravachol) 40 mg tablet [id number] Yes Take 1 tablet (40 mg) by mouth once daily. ALYSSA Basilio  Active   tamsulosin (Flomax) 0.4 mg 24 hr capsule [id number]  Take 1 capsule (0.4 mg) by mouth once daily at bedtime. ALYSSA Basilio  Active   triamcinolone (Kenalog) 0.1 % cream [id number]  Apply topically 2 times a day. Apply to affected area 1-2 times daily as needed.   Patient not taking: Reported on 8/5/2025    ALYSSA Basilio  Active                    DISEASE MANAGEMENT ASSESSMENT:     ANTICOAGULATION ASSESSMENT    DIAGNOSIS: {ANTICOAGDIAGNOSIS:99546}  - Patient is projected to be on anticoagulation ***    The ASCVD Risk score (Bianca DK, et al., 2019) failed to calculate for the following reasons:    The valid total cholesterol range is 130 to 320 mg/dL    LIONEL VASC SCORING CALCULATOR:   [id number] Stroke Risk Points: N/A    Values used to calculate this score:    Points  Metrics       1        Has Congestive Heart Failure: Yes       1        Has Hypertension: Yes       1        Age: 69       0        Has Diabetes: No       0        Had Stroke: No                 Had TIA: No                 Had Thromboembolism: No       0        Has Vascular Disease: No       0        Clinically Relevant Sex: Male    Lip GH, et al. 2009. © 2010 American College [employer] Chest Physicians     CURRENT PHARMACOTHERAPY:    Eliquis 5mg BID   68 yo  111 kg   SCr 1.24 (11/14/24) repeat ordered     Affordability/Accessibility: Eliquis $437/90ds   Adherence/Organization: ***  Adverse Reactions: ***  Recent Hospitalizations: None   Recent Falls/Trauma: ***  Changes in Tobacco or Alcohol Intake:   Tobacco: ***  Alcohol: ***    EDUCATION/COUNSELING:   - Counseled patient on MOA, expectations, duration of therapy, contraindications, administration, and monitoring parameters  - Counseled patient of side effects that are indicative of bleeding such as dark tarry stool, unexplainable bruising, or vomiting up a coffee ground like substance    DISCUSSION/NOTES:   Today was an initial visit to establish with clinical pharmacy. Patient medications and allergies were reviewed and updated.  ***    ASSESSMENT:   Patient Assistance Program (PAP)    Application for program to be submitted for the following medications: Eliquis    Prescription Insurance:  Yes   Paid Test Claim:  Yes   County of Permanent Address:  [facility]    Members of Household:  1***   Files Taxes:  {YES/NO:37811}     Patient will be {financialpaperwork:04011}    Patient verbally reports monthly or yearly income which is less than 400% federal poverty level    Patient aware this process may take up to 6 weeks.     If approved medication must be filled through [facility] and MEDICATION WILL BE MAILED TO PATIENT.       Assessment/Plan   Problem List Items Addressed This Visit     None        RECOMMENDATIONS/PLAN:    ***    Last Appnt with Referring Provider: 8/5/25  Next Appnt with Referring Provider: 1/13/26  Clinical Pharmacist follow up: ***  VAF/Application Expiration: {YES/DATE/NO:12814}  Type of Encounter: Natalie Barrera, John    Verbal consent to manage patient's drug therapy was obtained from the patient . They were informed they may decline to participate or withdraw from participation in pharmacy services at any time.    Continue all meds under the continuation of care with the referring provider and clinical pharmacy team.           [1]   Allergies  Allergen Reactions    Venom-Wasp Anaphylaxis    Bee Venom Protein (Honey Bee) Unknown   [2]   Past Medical History:  Diagnosis Date    Acute respiratory failure with hypoxia 07/15/2021    Acute respiratory failure with hypoxia    Renal insufficiency 02/17/2023   [3]   Current Outpatient Medications on File Prior to Visit   Medication Sig Dispense Refill    apixaban (Eliquis) 5 mg tablet Take 1 tablet (5 mg) by mouth 2 times a day. 60 tablet 0    aspirin 81 mg EC tablet Take 1 tablet (81 mg) by mouth once daily. 90 tablet 1    benazepril (Lotensin) 20 mg tablet Take 1 tablet (20 mg) by mouth once daily. 90 tablet 1    cholecalciferol (Vitamin D-3) 25 MCG (1000 UT) tablet Take 1 tablet (1,000 Units) by mouth once daily. 90 tablet 1    EPINEPHrine (AUVI-Q) 0.15 mg/0.15 mL inj auto-injector injection Inject 0.15 mL (0.15 mg) into the muscle 1 time if needed for anaphylaxis for up to 1 dose. 1 each 0    furosemide (Lasix) 40 mg tablet Take 1 tablet (40 mg) by mouth once daily. 90 tablet 1    metoprolol succinate XL (Toprol-XL) 50 mg 24 hr tablet Take 1 tablet (50 mg) by mouth once daily. 90 tablet 1    multivit-min/folic/vit K/lycop (ONE-A-DAY MEN'S 50 PLUS ORAL) Take 1 tablet by mouth once daily.      nystatin (Mycostatin) cream Apply topically 2 times a day. apply to affected area until healed. (Patient not taking:  Reported on 8/5/2025) 30 g 0    pravastatin (Pravachol) 40 mg tablet Take 1 tablet (40 mg) by mouth once daily. 90 tablet 1    tamsulosin (Flomax) 0.4 mg 24 hr capsule Take 1 capsule (0.4 mg) by mouth once daily at bedtime. 90 capsule 1    triamcinolone (Kenalog) 0.1 % cream Apply topically 2 times a day. Apply to affected area 1-2 times daily as needed. (Patient not taking: Reported on 8/5/2025) 80 g 1    [DISCONTINUED] apixaban (Eliquis DVT-PE Treat 30D Start) 5 mg (74 tabs) tablet Take 2 tablets (10 mg) by mouth 2 times a day for 7 days, then take 1 tablet (5 mg) by mouth 2 times a day. 74 tablet 0     No current facility-administered medications on file prior to visit.

## 2025-08-08 NOTE — PROGRESS NOTES
"  Pharmacist Clinic: Cardiology Management    Silvio Hassan \"Harry\" is a 69 y.o. male was referred to Clinical Pharmacy Team for anticoagulation management.     Referring Provider: Shikha Guzman, MER*    THIS IS A NEW PATIENT APPOINTMENT. PATIENT WILL BE ESTABLISHING CARE WITH CLINICAL PHARMACY.    Appointment was completed by *** who was reached at ***.    Allergies Reviewed? Yes    Allergies[1]    Medical History[2]    Medications Ordered Prior to Encounter[3]      RELEVANT LAB RESULTS:  Lab Results   Component Value Date    BILITOT 0.9 11/14/2024    CALCIUM 8.9 11/14/2024    CO2 34 (H) 11/14/2024     11/14/2024    CREATININE 1.24 11/14/2024    GLUCOSE 120 (H) 11/14/2024    ALKPHOS 53 11/14/2024    K 4.4 11/14/2024    PROT 6.0 (L) 11/14/2024     11/14/2024    AST 19 11/14/2024    ALT 17 11/14/2024    BUN 20 11/14/2024    ANIONGAP 10 08/10/2021    MG 2.18 06/28/2021    PHOS 3.9 06/28/2021    ALBUMIN 3.8 11/14/2024     Lab Results   Component Value Date    TRIG 81 11/14/2024    CHOL 126 11/14/2024    LDLCALC 60 11/14/2024    HDL 50.0 11/14/2024     No results found for: \"BMCBC\", \"CBCDIF\"     PHARMACEUTICAL ASSESSMENT:    MEDICATION RECONCILIATION    Was a medication reconciliation completed at this visit? Yes  Home Pharmacy Reviewed? Yes, describe: [facility]     Added:  - ***  Changed:  - ***  Removed:  - ***    Drug Interactions? {YES-DESCRIBE/NO:61444}    Medication Documentation Review Audit       Reviewed by Rosanne Berrios CMA (Medical Assistant) on 08/05/25 at 1308      Medication Order Taking? Sig Documenting Provider Last Dose Status   apixaban (Eliquis DVT-PE Treat 30D Start) 5 mg (74 tabs) tablet [id number] Yes Take 2 tablets (10 mg) by mouth 2 times a day for 7 days, then take 1 tablet (5 mg) by mouth 2 times a day. Keiko Lai MD  Active   aspirin 81 mg EC tablet [id number] Yes Take 1 tablet (81 mg) by mouth once daily. Gwen Donovan APRN-CNP  Active   benazepril " (Lotensin) 20 mg tablet [id number] Yes Take 1 tablet (20 mg) by mouth once daily. ALYSSA Basilio  Active   cholecalciferol (Vitamin D-3) 25 MCG (1000 UT) tablet [id number] Yes Take 1 tablet (1,000 Units) by mouth once daily. ALYSSA Basilio  Active   EPINEPHrine (AUVI-Q) 0.15 mg/0.15 mL inj auto-injector injection [id number] Yes Inject 0.15 mL (0.15 mg) into the muscle 1 time if needed for anaphylaxis for up to 1 dose. Paola Nunn DO  Active   furosemide (Lasix) 40 mg tablet [id number] Yes Take 1 tablet (40 mg) by mouth once daily. ALYSSA Basilio  Active   metoprolol succinate XL (Toprol-XL) 50 mg 24 hr tablet [id number] Yes Take 1 tablet (50 mg) by mouth once daily. ALYSSA Basilio  Active   multivit-min/folic/vit K/lycop (ONE-A-DAY MEN'S 50 PLUS ORAL) [id number] Yes Take 1 tablet by mouth once daily. Historical Provider, MD  Active   nystatin (Mycostatin) cream [id number]  Apply topically 2 times a day. apply to affected area until healed.   Patient not taking: Reported on 8/5/2025    ALYSSA Basilio  Active   pravastatin (Pravachol) 40 mg tablet [id number] Yes Take 1 tablet (40 mg) by mouth once daily. ALYSSA Basilio  Active   tamsulosin (Flomax) 0.4 mg 24 hr capsule [id number]  Take 1 capsule (0.4 mg) by mouth once daily at bedtime. ALYSSA Basilio  Active   triamcinolone (Kenalog) 0.1 % cream [id number]  Apply topically 2 times a day. Apply to affected area 1-2 times daily as needed.   Patient not taking: Reported on 8/5/2025    ALYSSA Basilio  Active                    DISEASE MANAGEMENT ASSESSMENT:     ANTICOAGULATION ASSESSMENT    DIAGNOSIS: {ANTICOAGDIAGNOSIS:87066}  - Patient is projected to be on anticoagulation ***    The ASCVD Risk score (Bianca DK, et al., 2019) failed to calculate for the following reasons:    The valid total cholesterol range is 130 to 320 mg/dL    LIONEL VASC SCORING CALCULATOR:   [id number] Stroke Risk Points: N/A    Values used to calculate this score:    Points  Metrics                Has Congestive Heart Failure: Yes                Has Hypertension: Yes                Age: 69                Has Diabetes: Not on file                Had Stroke: Not on file                 Had TIA: Not on file                 Had Thromboembolism: Not on file                Has Vascular Disease: Not on file                Clinically Relevant Sex: Not on file    Amaya BURDICK et al. 2009. © 2010 [employer]     CURRENT PHARMACOTHERAPY:    Eliquis 5mg BID   70 yo  111 kg   SCr 1.24 (11/14/24) repeat ordered     Affordability/Accessibility: Eliquis $437/90ds   Adherence/Organization: ***  Adverse Reactions: ***  Recent Hospitalizations: None   Recent Falls/Trauma: ***  Changes in Tobacco or Alcohol Intake:   Tobacco: ***  Alcohol: ***    EDUCATION/COUNSELING:   - Counseled patient on MOA, expectations, duration of therapy, contraindications, administration, and monitoring parameters  - Counseled patient of side effects that are indicative of bleeding such as dark tarry stool, unexplainable bruising, or vomiting up a coffee ground like substance    DISCUSSION/NOTES:   Today was an initial visit to establish with clinical pharmacy. Patient medications and allergies were reviewed and updated.  ***    ASSESSMENT:   Patient Assistance Program (PAP)    Application for program to be submitted for the following medications: Eliquis    Prescription Insurance:  Yes   Paid Test Claim:  Yes   County of Permanent Address:  [facility]    Members of Household:  1***   Files Taxes:  {YES/NO:27840}     Patient will be {financialpaperwork:06026}    Patient verbally reports monthly or yearly income which is less than 400% federal poverty level    Patient aware this process may take up to 6 weeks.     If approved medication must be filled through [facility] and MEDICATION WILL BE MAILED TO PATIENT.       Assessment/Plan   Problem List  Items Addressed This Visit    None        RECOMMENDATIONS/PLAN:    ***    Last Appnt with Referring Provider: 8/5/25  Next Appnt with Referring Provider: 1/13/26  Clinical Pharmacist follow up: ***  VAF/Application Expiration: {YES/DATE/NO:76371}  Type of Encounter: Natalie    Rosanne Barrera, PharmD    Verbal consent to manage patient's drug therapy was obtained from the patient . They were informed they may decline to participate or withdraw from participation in pharmacy services at any time.    Continue all meds under the continuation of care with the referring provider and clinical pharmacy team.           [1]   Allergies  Allergen Reactions    Venom-Wasp Anaphylaxis    Bee Venom Protein (Honey Bee) Unknown   [2]   Past Medical History:  Diagnosis Date    Acute respiratory failure with hypoxia 07/15/2021    Acute respiratory failure with hypoxia    Renal insufficiency 02/17/2023   [3]   Current Outpatient Medications on File Prior to Visit   Medication Sig Dispense Refill    apixaban (Eliquis) 5 mg tablet Take 1 tablet (5 mg) by mouth 2 times a day. 60 tablet 0    aspirin 81 mg EC tablet Take 1 tablet (81 mg) by mouth once daily. 90 tablet 1    benazepril (Lotensin) 20 mg tablet Take 1 tablet (20 mg) by mouth once daily. 90 tablet 1    cholecalciferol (Vitamin D-3) 25 MCG (1000 UT) tablet Take 1 tablet (1,000 Units) by mouth once daily. 90 tablet 1    EPINEPHrine (AUVI-Q) 0.15 mg/0.15 mL inj auto-injector injection Inject 0.15 mL (0.15 mg) into the muscle 1 time if needed for anaphylaxis for up to 1 dose. 1 each 0    furosemide (Lasix) 40 mg tablet Take 1 tablet (40 mg) by mouth once daily. 90 tablet 1    metoprolol succinate XL (Toprol-XL) 50 mg 24 hr tablet Take 1 tablet (50 mg) by mouth once daily. 90 tablet 1    multivit-min/folic/vit K/lycop (ONE-A-DAY MEN'S 50 PLUS ORAL) Take 1 tablet by mouth once daily.      nystatin (Mycostatin) cream Apply topically 2 times a day. apply to affected area until  healed. (Patient not taking: Reported on 8/5/2025) 30 g 0    pravastatin (Pravachol) 40 mg tablet Take 1 tablet (40 mg) by mouth once daily. 90 tablet 1    tamsulosin (Flomax) 0.4 mg 24 hr capsule Take 1 capsule (0.4 mg) by mouth once daily at bedtime. 90 capsule 1    triamcinolone (Kenalog) 0.1 % cream Apply topically 2 times a day. Apply to affected area 1-2 times daily as needed. (Patient not taking: Reported on 8/5/2025) 80 g 1    [DISCONTINUED] apixaban (Eliquis DVT-PE Treat 30D Start) 5 mg (74 tabs) tablet Take 2 tablets (10 mg) by mouth 2 times a day for 7 days, then take 1 tablet (5 mg) by mouth 2 times a day. 74 tablet 0     No current facility-administered medications on file prior to visit.

## 2025-08-11 ENCOUNTER — APPOINTMENT (OUTPATIENT)
Dept: PHARMACY | Facility: HOSPITAL | Age: 70
End: 2025-08-11
Payer: MEDICARE

## 2025-08-12 ENCOUNTER — APPOINTMENT (OUTPATIENT)
Dept: PHARMACY | Facility: HOSPITAL | Age: 70
End: 2025-08-12
Payer: MEDICARE

## 2025-08-12 DIAGNOSIS — I82.4Y1 ACUTE DEEP VEIN THROMBOSIS (DVT) OF PROXIMAL VEIN OF RIGHT LOWER EXTREMITY: Primary | ICD-10-CM

## 2025-08-12 NOTE — ASSESSMENT & PLAN NOTE
CONTINUE Eliquis 5mg BID (dosed appropriately) for VTE/CVA prophylaxis.  68 yo  111 kg   SCr 1.24 (11/14/24)  Monitor for abnormal bruising and bleeding.

## 2025-08-12 NOTE — Clinical Note
Hello, · Patient was referred to clinical pharmacy for cost assistance of Eliquis.  · Patient recently had a DVT and was started on Eliquis. He was about to run out of medication but a one month free trial was sent to local  pharmacy. Patient now has a 1 month supply of medication at home.  · Family reports adherence to medication without abnormal bruising/bleeding. Patient did fall a few weeks ago and was cleared by the ER.  · Patient should qualify for PAP once financial paperwork received.  · Follow up in 1 month to monitor status of PAP and make sure patient received medication.

## 2025-08-13 DIAGNOSIS — I82.4Y1 ACUTE DEEP VEIN THROMBOSIS (DVT) OF PROXIMAL VEIN OF RIGHT LOWER EXTREMITY: ICD-10-CM

## 2025-08-15 ENCOUNTER — TELEPHONE (OUTPATIENT)
Dept: PHARMACY | Facility: HOSPITAL | Age: 70
End: 2025-08-15
Payer: MEDICARE

## 2025-08-19 RX ORDER — TAMSULOSIN HYDROCHLORIDE 0.4 MG/1
0.4 CAPSULE ORAL NIGHTLY
Qty: 90 CAPSULE | Refills: 1 | OUTPATIENT
Start: 2025-08-19

## 2025-08-19 RX ORDER — BENAZEPRIL HYDROCHLORIDE 20 MG/1
20 TABLET ORAL DAILY
Qty: 90 TABLET | Refills: 1 | OUTPATIENT
Start: 2025-08-19

## 2025-08-30 PROCEDURE — RXMED WILLOW AMBULATORY MEDICATION CHARGE

## 2025-09-03 ENCOUNTER — PHARMACY VISIT (OUTPATIENT)
Dept: PHARMACY | Facility: CLINIC | Age: 70
End: 2025-09-03
Payer: MEDICARE

## 2025-09-09 ENCOUNTER — APPOINTMENT (OUTPATIENT)
Dept: PHARMACY | Facility: HOSPITAL | Age: 70
End: 2025-09-09
Payer: MEDICARE

## 2025-11-10 ENCOUNTER — APPOINTMENT (OUTPATIENT)
Dept: PRIMARY CARE | Facility: CLINIC | Age: 70
End: 2025-11-10
Payer: MEDICARE

## 2026-01-13 ENCOUNTER — APPOINTMENT (OUTPATIENT)
Dept: CARDIOLOGY | Facility: CLINIC | Age: 71
End: 2026-01-13
Payer: MEDICARE

## 2026-01-19 ENCOUNTER — APPOINTMENT (OUTPATIENT)
Dept: PRIMARY CARE | Facility: CLINIC | Age: 71
End: 2026-01-19
Payer: MEDICARE